# Patient Record
Sex: FEMALE | Race: WHITE | Employment: STUDENT | URBAN - METROPOLITAN AREA
[De-identification: names, ages, dates, MRNs, and addresses within clinical notes are randomized per-mention and may not be internally consistent; named-entity substitution may affect disease eponyms.]

---
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
## 2020-09-28 ENCOUNTER — TELEMEDICINE (OUTPATIENT)
Dept: FAMILY MEDICINE CLINIC | Facility: CLINIC | Age: 20
End: 2020-09-28

## 2020-09-28 ENCOUNTER — TELEPHONE (OUTPATIENT)
Dept: FAMILY MEDICINE CLINIC | Facility: CLINIC | Age: 20
End: 2020-09-28

## 2020-09-28 VITALS — TEMPERATURE: 100 F | BODY MASS INDEX: 20.46 KG/M2 | WEIGHT: 135 LBS | RESPIRATION RATE: 16 BRPM | HEIGHT: 68 IN

## 2020-09-28 DIAGNOSIS — Z20.822 SUSPECTED COVID-19 VIRUS INFECTION: ICD-10-CM

## 2020-09-28 DIAGNOSIS — J02.9 SORE THROAT: Primary | ICD-10-CM

## 2020-09-28 PROCEDURE — 3008F BODY MASS INDEX DOCD: CPT | Performed by: FAMILY MEDICINE

## 2020-09-28 PROCEDURE — 99213 OFFICE O/P EST LOW 20 MIN: CPT | Performed by: FAMILY MEDICINE

## 2020-09-28 NOTE — TELEPHONE ENCOUNTER
Pt called stating she had a video heather with .  She said  placed a Covid test for her. Pt stated THE [facility] called her to schedule. But Pt is now asking for a Rapid test because she needs to go back to work.

## 2020-09-28 NOTE — PROGRESS NOTES
Video visit  Please note that the following visit was completed using two-way, real-time interactive audio and video communication.   This has been done in good serenity to provide continuity of care in the best interest of the provider-patient relationship, d Other (Other) Mother         migraines   • High Cholesterol Maternal Grandmother    • High Blood Pressure Maternal Grandmother    • Other (Other) Maternal Grandmother         seizure disorder, migraines, osteoporosis   • High Cholesterol Maternal Grandfath

## 2020-09-29 NOTE — TELEPHONE ENCOUNTER
Spoke to patient and advised Rapid testing with same day results is not available through the doctor's offices. Dr. Elvia Walter has ordered the test with the most rapid results available to offices.  Explained with limited resources, the rapid tests are reserved

## 2020-10-22 ENCOUNTER — IMMUNIZATION (OUTPATIENT)
Dept: FAMILY MEDICINE CLINIC | Facility: CLINIC | Age: 20
End: 2020-10-22

## 2020-10-22 DIAGNOSIS — Z23 NEED FOR VACCINATION: ICD-10-CM

## 2020-10-22 PROCEDURE — 90686 IIV4 VACC NO PRSV 0.5 ML IM: CPT | Performed by: FAMILY MEDICINE

## 2020-10-22 PROCEDURE — 90471 IMMUNIZATION ADMIN: CPT | Performed by: FAMILY MEDICINE

## 2020-11-01 ENCOUNTER — APPOINTMENT (OUTPATIENT)
Dept: LAB | Age: 20
End: 2020-11-01
Attending: FAMILY MEDICINE
Payer: COMMERCIAL

## 2020-11-01 DIAGNOSIS — Z20.822 SUSPECTED COVID-19 VIRUS INFECTION: ICD-10-CM

## 2020-11-01 DIAGNOSIS — J02.9 SORE THROAT: ICD-10-CM

## 2020-11-13 ENCOUNTER — OFFICE VISIT (OUTPATIENT)
Dept: FAMILY MEDICINE CLINIC | Facility: CLINIC | Age: 20
End: 2020-11-13

## 2020-11-13 VITALS
WEIGHT: 127.38 LBS | TEMPERATURE: 97 F | RESPIRATION RATE: 16 BRPM | DIASTOLIC BLOOD PRESSURE: 62 MMHG | SYSTOLIC BLOOD PRESSURE: 104 MMHG | OXYGEN SATURATION: 99 % | HEART RATE: 94 BPM | HEIGHT: 68 IN | BODY MASS INDEX: 19.31 KG/M2

## 2020-11-13 DIAGNOSIS — K90.0 CELIAC DISEASE: ICD-10-CM

## 2020-11-13 DIAGNOSIS — E55.9 VITAMIN D DEFICIENCY: ICD-10-CM

## 2020-11-13 DIAGNOSIS — Z00.00 ROUTINE GENERAL MEDICAL EXAMINATION AT A HEALTH CARE FACILITY: Primary | ICD-10-CM

## 2020-11-13 DIAGNOSIS — Z30.011 ENCOUNTER FOR INITIAL PRESCRIPTION OF CONTRACEPTIVE PILLS: ICD-10-CM

## 2020-11-13 DIAGNOSIS — Z00.00 LABORATORY EXAM ORDERED AS PART OF ROUTINE GENERAL MEDICAL EXAMINATION: ICD-10-CM

## 2020-11-13 PROCEDURE — 3074F SYST BP LT 130 MM HG: CPT | Performed by: FAMILY MEDICINE

## 2020-11-13 PROCEDURE — 81025 URINE PREGNANCY TEST: CPT | Performed by: FAMILY MEDICINE

## 2020-11-13 PROCEDURE — 3078F DIAST BP <80 MM HG: CPT | Performed by: FAMILY MEDICINE

## 2020-11-13 PROCEDURE — 99395 PREV VISIT EST AGE 18-39: CPT | Performed by: FAMILY MEDICINE

## 2020-11-13 PROCEDURE — 3008F BODY MASS INDEX DOCD: CPT | Performed by: FAMILY MEDICINE

## 2020-11-13 RX ORDER — NORETHINDRONE ACETATE AND ETHINYL ESTRADIOL 1MG-20(21)
1 KIT ORAL DAILY
Qty: 3 PACKAGE | Refills: 0 | Status: SHIPPED | OUTPATIENT
Start: 2020-11-13

## 2020-11-13 NOTE — PROGRESS NOTES
Patient presents with:  Physical      HPI:  22yr old female with celiac disease presents for her annual physical and wanting to start OCPs. Menses regular, LMP 11/2. Lasting about 6d. Never sexually active but wanting to start OCPs.    Celiac disease, stab • High Blood Pressure Maternal Grandfather    • Cancer Paternal Grandmother         HX uterine CA, had hysterectomy and radiation, in remission   • Diabetes Sister         type 1     Social History    Tobacco Use      Smoking status: Never Smoker      Sm Date(s) Administered    >=3 YRS QUAD MULTIDOSE VIAL (20074) FLU CLINIC                          11/03/2016  10/26/2017      DTAP                  02/19/2001 04/17/2001 06/18/2001 03/11/2002 02/11/2005      FLU VAC QIV the age of 36.   If positive family hx (first degree relative) - Annual mammography starting ten years prior to the age of the youngest family member with breast cancer (but not earlier than age 22 and not later than age 36)    Colon Cancer Screening: Start

## 2020-11-13 NOTE — PATIENT INSTRUCTIONS
Prevention Guidelines, Women Ages 25 to 44  Screening tests and vaccines are an important part of managing your health. A screening test is done to find possible disorders or diseases in people who don't have any symptoms.  The goal is to find a disease e Hepatitis C Anyone at increased risk  At routine exams   HIV All women At routine exams3     Obesity All women in this age group  At routine exams   Syphilis Women at increased risk for infection should talk with their healthcare provider  At routine exams Pneumococcal conjugate vaccine (PCV13) and pneumococcal polysaccharide vaccine (PPSV23)  Women at increased risk for infection should talk with their healthcare provider  PCV13: 1 dose ages 23 to 72 (protects against 13 types of pneumococcal bacteria)   PP 3 The USPSTF recommends that all people ages 13 to 72 years be screened for HIV and those younger or older people at increased risk.  The CDC recommends that everyone between the ages of 15 and 59 get tested for HIV at least once as part of routine health c · May cause side effects such as nausea, irregular bleeding, headaches, breast tenderness, fatigue, or mood changes (these often go away within 3 months)  · May increase the risk of blood clots, heart attack, and stroke    The pill may not be for you  The

## 2021-01-15 ENCOUNTER — IMMUNIZATION (OUTPATIENT)
Dept: LAB | Facility: HOSPITAL | Age: 21
End: 2021-01-15
Attending: EMERGENCY MEDICINE
Payer: COMMERCIAL

## 2021-01-15 DIAGNOSIS — Z23 NEED FOR VACCINATION: Primary | ICD-10-CM

## 2021-01-15 PROCEDURE — 0011A SARSCOV2 VAC 100MCG/0.5ML IM: CPT

## 2021-02-12 ENCOUNTER — IMMUNIZATION (OUTPATIENT)
Dept: LAB | Facility: HOSPITAL | Age: 21
End: 2021-02-12
Attending: PREVENTIVE MEDICINE
Payer: COMMERCIAL

## 2021-02-12 DIAGNOSIS — Z23 NEED FOR VACCINATION: Primary | ICD-10-CM

## 2021-02-12 PROCEDURE — 0012A SARSCOV2 VAC 100MCG/0.5ML IM: CPT

## 2021-05-03 ENCOUNTER — TELEPHONE (OUTPATIENT)
Dept: FAMILY MEDICINE CLINIC | Facility: CLINIC | Age: 21
End: 2021-05-03

## 2021-05-03 NOTE — TELEPHONE ENCOUNTER
Called patient who states for the past couple of days she has been experiencing sinus pressure and ear pain. Denies fever/chills, sore throat or cough. Denies any seasonal allergies. Has been taking Dayquil.  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

## 2021-05-07 ENCOUNTER — LAB ENCOUNTER (OUTPATIENT)
Dept: LAB | Age: 21
End: 2021-05-07
Attending: FAMILY MEDICINE
Payer: COMMERCIAL

## 2021-05-07 DIAGNOSIS — Z00.00 LABORATORY EXAM ORDERED AS PART OF ROUTINE GENERAL MEDICAL EXAMINATION: ICD-10-CM

## 2021-05-07 DIAGNOSIS — E55.9 VITAMIN D DEFICIENCY: ICD-10-CM

## 2021-05-07 PROCEDURE — 85025 COMPLETE CBC W/AUTO DIFF WBC: CPT

## 2021-05-07 PROCEDURE — 84443 ASSAY THYROID STIM HORMONE: CPT

## 2021-05-07 PROCEDURE — 80061 LIPID PANEL: CPT

## 2021-05-07 PROCEDURE — 80053 COMPREHEN METABOLIC PANEL: CPT

## 2021-05-07 PROCEDURE — 36415 COLL VENOUS BLD VENIPUNCTURE: CPT

## 2021-05-07 PROCEDURE — 82306 VITAMIN D 25 HYDROXY: CPT

## 2021-08-17 ENCOUNTER — TELEPHONE (OUTPATIENT)
Dept: FAMILY MEDICINE CLINIC | Facility: CLINIC | Age: 21
End: 2021-08-17

## 2021-08-17 NOTE — TELEPHONE ENCOUNTER
Patient's mom stated pt was at the dentist where they did xrays. Dentist told pt that, according to the xray, pt could have a sinus infection. Mom stated pt has no symptoms and that pt feels fine.   Dentist also told pt that she has a tooth, in front, a

## 2021-08-17 NOTE — TELEPHONE ENCOUNTER
No call back# confirmed. [phone]  Called and spoke with pt's mother, Rodrigo Sorto (Rosy Fajardo per HIPAA), to confirm pt is asymptomatic. Pt is leaving for school this Saturday. Advised we do not prescribe antibiotic without an OV.  Advised if pt develops, we recom

## 2021-12-14 ENCOUNTER — IMMUNIZATION (OUTPATIENT)
Dept: LAB | Facility: HOSPITAL | Age: 21
End: 2021-12-14
Attending: EMERGENCY MEDICINE
Payer: COMMERCIAL

## 2021-12-14 DIAGNOSIS — Z23 NEED FOR VACCINATION: Primary | ICD-10-CM

## 2021-12-14 PROCEDURE — 0064A SARSCOV2 VAC 50MCG/0.25ML IM: CPT

## 2022-01-05 ENCOUNTER — TELEPHONE (OUTPATIENT)
Dept: FAMILY MEDICINE CLINIC | Facility: CLINIC | Age: 22
End: 2022-01-05

## 2022-01-05 NOTE — TELEPHONE ENCOUNTER
[phone] pt stating had experienced 5 episodes of vomiting and 6 watery BM yesterday (1/4/22). No vomiting today (1/5). BM better, semi-formed. No abd pain. No swelling. No fever/chills. No radiating pain. No recent travels. No dizziness.  No

## 2022-04-11 ENCOUNTER — PROCEDURE VISIT (OUTPATIENT)
Dept: SPORTS MEDICINE | Age: 22
End: 2022-04-11

## 2022-04-11 DIAGNOSIS — S63.639A SPRAIN OF PROXIMAL INTERPHALANGEAL (PIP) JOINT OF FINGER: Primary | ICD-10-CM

## 2022-04-11 NOTE — PROGRESS NOTES
Athletic Training  Date of Report: 2022  Name: Angela Milleroked: [facility]  Sport: [employer] [place]  : 2000  Age: 24 y.o. MRN: <[id number]>  Encounter:  [x] New AT Eval     [] Follow-Up Visit    [] Other:   SUBJECTIVE:  Reason for Visit:    Chief Complaint   Patient presents with   Fam Douglas is a 24y.o. year old, female who presents today for evaluation of athletic injury involving left wrist/hand. Onset of the injury began a few days ago and injury occurred during competition. Current pain and symptoms include: sharp and tight. Current level of pain is a 6. Symptoms have been acute since that time. Symptoms improve with rest, ice and medication: ibuprofen. Symptoms worsen with straightening and bending finger. The patient can not extend and flex the hand and all fingers. The hand has not felt numb and/or lost sensation. The hand/wrist complaint has limited the athlete from participating. Associated sounds or feelings at time of injury included: none. Treatment to date has included: ice, medication: ibuprofen, rest and taping. Treatment has been somewhat helpful. Previous history includes: Jammed Fingers. 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 She was blocking a shot with her left hand when she felt her L ring finger 'jam.' She was unable to shake the injury off and was not able to play in the rest of the games. Denies hearing or feeling a crack/pop in her finger. She has kept her finger dallin taped since it occurred on Saturday. She has also been icing occasionally and taking ibuprofen. Initially, when the injury occurred she was not able to flex or extend her 4th finger. Since Saturday her finger flexion has improved but finger extension is still extremely painful. Bruising and swelling present around L ring finger.    OBJECTIVE:   Physical Exam  Vital Signs:   [x] There were no vitals taken for this visit  Date/Time Taken         Blood Pressure Pulse          Constitution:   Appearance: Maisha Edmond is [x] alert, [x] appears stated age, and [x] in no distress. Maisha Edmond general body habitus is:    [] Cachectic [] Thin [x] Normal [] Obese [] Morbidly Obese  Pulmonary: Rate   [] Fast [x] Normal [] Slow    Rhythm  [x] Regular [] Irregular   Volume [x] Adequate  [] Shallow [] Deep  Effort  [] Labored [x] Unlabored  Skin:  Color  [x] Normal [] Pale [] Cyanotic    Temperature [] Hot   [x] Warm [] Cool  [] Cold     Moisture [] Dry  [x] Moist [] Warm    Psychiatric:   [x] Good judgement and insight. [x] Oriented to [x] person, [x] place, and [x] time. [x] Mood appropriate for circumstances.   Elbow Positioning / Carry Position:    Elbow Position: [x] Normal  [] Guarding   [] Hanging Limp  Assistive Device: [x] None  [] Brace  [] Sling  [] Other:   Inspection:   Skin:   [x] Intact [] Abrasion  [] Laceration  Notes:   Ecchymosis:  [] None [x] Mild  [] Moderate  [] Severe  Notes:   Atrophy:  [x] None [] Mild  [] Moderate  [] Severe  Notes:   Effusion:  [] None [x] Mild  [] Moderate  [] Severe  Notes:   Deformity:  [x] None [] Mild  [] Moderate  [] Severe  Notes:   Scar / Surgical incision(s): [] A-Scope Portals  [] Open Surgical Incision(s)  Notes:   Joint Hypertrophy:  Notes:   Alignment:   [x] Alignment was not assessed   Normal Measured Findings/Notes Passively Correctable to Normal   Ape Hand [x]  []   Fuller's Deformity [x]  []   Claw Hand [x]  []   Dupuytren's Contracture [x]  []   [place] -Neck Deformity [x]  []   Volkmann's Contracture [x]  []   Fingernail Alignment []  []   Orthopaedic Exam: Left Hand/Wrist  Palpation:   Tenderness: [] None  [] Mild [x] Moderate [] Severe   at: Proximal Phalanges 4 and PIP Joint 4  Crepitation: [x] None  [] Mild [] Moderate [] Severe   at: Metacarpals 4, MCP Joint 4, Proximal Phalanges 4, PIP Joint 4, Middle Phalanges 4, DIP Joint 4 and Distal Phalanges 4  Effusion: [] None  [x] Mild [] Moderate [] Severe   at: Proximal Phalanges 4 and PIP Joint 4  Brachial Pulse:  [x] Not assessed [] Not Detected [] Detected  Radial Pulse:  [x] Not assessed [] Not Detected [] Detected  Deformity:  None  Range of Motion: (Not assessed if not marked)  [] Normal Flexibility / Mobility   ROM WNL PROM AROM OP Comments     L R L R L R    Wrist           Flexion  [x]          Extension [x]          Supination [x]          Pronation [x]          Ulnar Deviation [x]          Radial Deviation [x]          Fingers           MCP Flexion  [x]          MCP Extension [x]          MCP Abduction [x]          MCP Adduction [x]          PIP Flexion  [] mild p!  mild p! Almost WNL   PIP Extension [] p! P!     Very painful   DIP Flexion  [x]          DIP Extension [x]          Thumb-CMC           Flexion  []          Extension []          Abduction []          Adduction []          Manual Muscle Test: (Not assessed if not marked)  [] Normal Strength  MMT Left Right Comment   Wrist      Flexion       Extension      Supination      Pronation      Ulnar Deviation      Radial Deviation      Fingers      MCP Flexion       MCP Extension      MCP Abduction      MCP Adduction      PIP Flexion       PIP Extension      DIP Flexion       DIP Extension       Dynamometry      Thumb-CMC      Flexion       Extension      Abduction      Adduction      Provocative Tests: (Not tested if not marked)   Negative Positive Positive Findings   Fracture / Dislocation      Tap [x] []    Compression [x] []    Lozada's Sign [] []    R/C Stability      Varus Stress Test [] []    Valgus [] []    Glide [] []    Neurovascular      Tinel Sign [] []    Wrinkle Test [] []    Digital Wan Test [] []    Elbow Flex [] []    Wrist Pathology       Phalen Test [] []    Reverse Phalen Test [] []    Hand Pathology       Long Finger Flexion Test [] []    Bunnel Littler Test [] []    Pinch  [] []    Finger Pathology      MCP [place] [] []    PIP Varus Stress Test [x] []    PIP Valgus Stress Test [x] []    DIP Varus Stress Test [x] []    DIP Valgus Stress Test [x] []    Thumb Pathology      Marti Dallin [] []    deQuervain's Sign [] []    Froment's Sign [] []    Hubbard Test [] []    Miscellaneous       [] []     [] []    Reflex / Motor Function:    Gross motor weakness of shoulder:  [x] None [] Mild  [] Moderate [] Severe  Notes:   Gross motor weakness of elbow:  [x] None [] Mild  [] Moderate [] Severe  Notes:   Gross motor weakness of wrist:  [x] None [] Mild  [] Moderate [] Severe  Notes:   Gross motor weakness of hand:  [x] None [] Mild  [] Moderate [] Severe  Notes:    Sensory / Neurologic Function:  [x] Sensation to light touch intact    [] Impaired:   [x] Deep tendon reflexes intact    [] Impaired:   [x] Coordination / proprioception intact  [] Impaired:   Contralateral Hand / Wrist:  [x] Normal ROM and function with no pain. ASSESSMENT:   Diagnosis Orders   1. Sprain of proximal interphalangeal (PIP) joint of finger       Clinical Impression: Phalange: Ring Proximal Interphalangeal Sprain  Status: No Participation   Est. Time Missed: 3-7 Days  PLAN:  Treatment:  [x] Rest  [x] Ice   [] Wrap  [] Elevate  [x] Tape  [] First Aid/Wound [] Moist Heat  [] Crutches  [] Brace  [] Splint  [] Sling  [] Immobilizer   [] Whirlpool  [] Massage  [] Pneumatic  [x] Rehab/Exercise  [x] Other: NSAIDs   Guardian Contacted: No  Comments / Instructions: Eleni's left 4th PIP and proximal phalange are currently swollen. Very ttp 4th PIP. No concern for fracture but there is a possibility of a tendon injury. Instructed her on HEP and she will follow-up at the end of the week if there is no improvement in her finger extension. No participation at this time. Follow-Up Care / Instructions:   HEP Information: Continue to wear dallin tape throughout the day. Instructed her to ice multiple times a day and continue to take ibuprofen regularly.  Continue to work on flexion and extension as tolerated throughout the day.    Discharged: Yes  Electronically Signed By: Selina Guillermo ATC, LAT

## 2022-04-21 ENCOUNTER — PROCEDURE VISIT (OUTPATIENT)
Dept: SPORTS MEDICINE | Age: 22
End: 2022-04-21

## 2022-04-21 DIAGNOSIS — S63.639A SPRAIN OF PROXIMAL INTERPHALANGEAL (PIP) JOINT OF FINGER: Primary | ICD-10-CM

## 2022-04-21 NOTE — PROGRESS NOTES
Subjective:      Patient ID: Dean Raymond is a 24 y.o. female. Tal Block comes in today to have her L 4th phalange re-evaluated. She reports her finger feels significantly improved but is still not at 100%. Notes that her ROM has improved but is not full yet. 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 She has practiced but has not had to block any shots with her L hand. She continues to dallin tape her finger for practice and it feels better when it is taped. Objective:   Physical Exam     No bruising or swelling present on L 4th PIP/Phalange. Still mild p! With P/AROM full finger flexion and extension. Able to achieve full finger flexion and extension with PROM. Assessment:       Diagnosis Orders   1. Sprain of proximal interphalangeal (PIP) joint of finger           Plan:      Tal Block is going to try to play a few quarters in the games this weekend. She is going to keep her finger dallin taped for the games as there is a good chance her finger will be in increased pain if the ball hits her L hand wrong. Advised her to continue to work on both her finger flexion and extension as tolerated. She will follow-up with us after the weekend.          Yoel Babcock, ATC

## 2022-05-12 ENCOUNTER — HOSPITAL ENCOUNTER (OUTPATIENT)
Dept: GENERAL RADIOLOGY | Age: 22
Discharge: HOME | End: 2022-05-12
Attending: FAMILY MEDICINE
Payer: COMMERCIAL

## 2022-05-12 ENCOUNTER — OFFICE VISIT (OUTPATIENT)
Dept: FAMILY MEDICINE CLINIC | Facility: CLINIC | Age: 22
End: 2022-05-12
Payer: COMMERCIAL

## 2022-05-12 VITALS
HEIGHT: 68 IN | DIASTOLIC BLOOD PRESSURE: 66 MMHG | RESPIRATION RATE: 16 BRPM | TEMPERATURE: 99 F | WEIGHT: 123.13 LBS | SYSTOLIC BLOOD PRESSURE: 110 MMHG | OXYGEN SATURATION: 99 % | BODY MASS INDEX: 18.66 KG/M2 | HEART RATE: 78 BPM

## 2022-05-12 DIAGNOSIS — S69.92XA INJURY OF FINGER OF LEFT HAND, INITIAL ENCOUNTER: ICD-10-CM

## 2022-05-12 DIAGNOSIS — S69.92XA INJURY OF FINGER OF LEFT HAND, INITIAL ENCOUNTER: Primary | ICD-10-CM

## 2022-05-12 PROCEDURE — 99213 OFFICE O/P EST LOW 20 MIN: CPT | Performed by: FAMILY MEDICINE

## 2022-05-12 PROCEDURE — 3008F BODY MASS INDEX DOCD: CPT | Performed by: FAMILY MEDICINE

## 2022-05-12 PROCEDURE — 73140 X-RAY EXAM OF FINGER(S): CPT | Performed by: FAMILY MEDICINE

## 2022-05-12 PROCEDURE — 3078F DIAST BP <80 MM HG: CPT | Performed by: FAMILY MEDICINE

## 2022-05-12 PROCEDURE — 3074F SYST BP LT 130 MM HG: CPT | Performed by: FAMILY MEDICINE

## 2022-06-02 ENCOUNTER — OFFICE VISIT (OUTPATIENT)
Dept: ORTHOPEDICS CLINIC | Facility: CLINIC | Age: 22
End: 2022-06-02
Payer: COMMERCIAL

## 2022-06-02 VITALS — OXYGEN SATURATION: 100 % | HEART RATE: 61 BPM | BODY MASS INDEX: 18.94 KG/M2 | WEIGHT: 125 LBS | HEIGHT: 68 IN

## 2022-06-02 DIAGNOSIS — M24.542 FLEXION CONTRACTURE OF JOINT OF HAND, LEFT: Primary | ICD-10-CM

## 2022-06-02 PROCEDURE — 99204 OFFICE O/P NEW MOD 45 MIN: CPT | Performed by: PHYSICIAN ASSISTANT

## 2022-06-02 PROCEDURE — 3008F BODY MASS INDEX DOCD: CPT | Performed by: PHYSICIAN ASSISTANT

## 2022-06-06 ENCOUNTER — TELEPHONE (OUTPATIENT)
Dept: PHYSICAL THERAPY | Facility: HOSPITAL | Age: 22
End: 2022-06-06

## 2022-06-08 ENCOUNTER — OFFICE VISIT (OUTPATIENT)
Dept: OCCUPATIONAL MEDICINE | Age: 22
End: 2022-06-08
Attending: PHYSICIAN ASSISTANT
Payer: COMMERCIAL

## 2022-06-08 DIAGNOSIS — M24.542 FLEXION CONTRACTURE OF JOINT OF HAND, LEFT: ICD-10-CM

## 2022-06-08 PROCEDURE — 97760 ORTHOTIC MGMT&TRAING 1ST ENC: CPT

## 2022-06-08 PROCEDURE — 97035 APP MDLTY 1+ULTRASOUND EA 15: CPT

## 2022-06-08 PROCEDURE — 97165 OT EVAL LOW COMPLEX 30 MIN: CPT

## 2022-06-13 ENCOUNTER — OFFICE VISIT (OUTPATIENT)
Dept: OCCUPATIONAL MEDICINE | Age: 22
End: 2022-06-13
Attending: PHYSICIAN ASSISTANT
Payer: COMMERCIAL

## 2022-06-13 DIAGNOSIS — M24.542 FLEXION CONTRACTURE OF JOINT OF HAND, LEFT: ICD-10-CM

## 2022-06-13 PROCEDURE — 97530 THERAPEUTIC ACTIVITIES: CPT

## 2022-06-13 PROCEDURE — 97110 THERAPEUTIC EXERCISES: CPT

## 2022-06-13 PROCEDURE — 97760 ORTHOTIC MGMT&TRAING 1ST ENC: CPT

## 2022-06-15 ENCOUNTER — OFFICE VISIT (OUTPATIENT)
Dept: OCCUPATIONAL MEDICINE | Age: 22
End: 2022-06-15
Attending: PHYSICIAN ASSISTANT
Payer: COMMERCIAL

## 2022-06-15 DIAGNOSIS — M24.542 FLEXION CONTRACTURE OF JOINT OF HAND, LEFT: ICD-10-CM

## 2022-06-15 PROCEDURE — 97760 ORTHOTIC MGMT&TRAING 1ST ENC: CPT

## 2022-06-15 PROCEDURE — 97530 THERAPEUTIC ACTIVITIES: CPT

## 2022-06-15 PROCEDURE — 97035 APP MDLTY 1+ULTRASOUND EA 15: CPT

## 2022-06-15 PROCEDURE — 97140 MANUAL THERAPY 1/> REGIONS: CPT

## 2022-06-20 ENCOUNTER — OFFICE VISIT (OUTPATIENT)
Dept: OCCUPATIONAL MEDICINE | Age: 22
End: 2022-06-20
Attending: PHYSICIAN ASSISTANT
Payer: COMMERCIAL

## 2022-06-20 DIAGNOSIS — M24.542 FLEXION CONTRACTURE OF JOINT OF HAND, LEFT: ICD-10-CM

## 2022-06-20 PROCEDURE — 97140 MANUAL THERAPY 1/> REGIONS: CPT

## 2022-06-20 PROCEDURE — 97760 ORTHOTIC MGMT&TRAING 1ST ENC: CPT

## 2022-06-20 PROCEDURE — 97110 THERAPEUTIC EXERCISES: CPT

## 2022-06-20 PROCEDURE — 97530 THERAPEUTIC ACTIVITIES: CPT

## 2022-06-22 ENCOUNTER — OFFICE VISIT (OUTPATIENT)
Dept: OCCUPATIONAL MEDICINE | Age: 22
End: 2022-06-22
Attending: PHYSICIAN ASSISTANT
Payer: COMMERCIAL

## 2022-06-22 DIAGNOSIS — M24.542 FLEXION CONTRACTURE OF JOINT OF HAND, LEFT: ICD-10-CM

## 2022-06-22 PROCEDURE — 97760 ORTHOTIC MGMT&TRAING 1ST ENC: CPT

## 2022-06-22 PROCEDURE — 97140 MANUAL THERAPY 1/> REGIONS: CPT

## 2022-06-22 PROCEDURE — 97035 APP MDLTY 1+ULTRASOUND EA 15: CPT

## 2022-06-27 ENCOUNTER — OFFICE VISIT (OUTPATIENT)
Dept: OCCUPATIONAL MEDICINE | Age: 22
End: 2022-06-27
Attending: PHYSICIAN ASSISTANT
Payer: COMMERCIAL

## 2022-06-27 DIAGNOSIS — M24.542 FLEXION CONTRACTURE OF JOINT OF HAND, LEFT: ICD-10-CM

## 2022-06-27 PROCEDURE — 97110 THERAPEUTIC EXERCISES: CPT

## 2022-06-27 PROCEDURE — 97140 MANUAL THERAPY 1/> REGIONS: CPT

## 2022-06-27 PROCEDURE — 97530 THERAPEUTIC ACTIVITIES: CPT

## 2022-06-27 PROCEDURE — 97035 APP MDLTY 1+ULTRASOUND EA 15: CPT

## 2022-06-29 ENCOUNTER — OFFICE VISIT (OUTPATIENT)
Dept: OCCUPATIONAL MEDICINE | Age: 22
End: 2022-06-29
Attending: PHYSICIAN ASSISTANT
Payer: COMMERCIAL

## 2022-06-29 DIAGNOSIS — M24.542 FLEXION CONTRACTURE OF JOINT OF HAND, LEFT: ICD-10-CM

## 2022-06-29 PROCEDURE — 97035 APP MDLTY 1+ULTRASOUND EA 15: CPT

## 2022-06-29 PROCEDURE — 97110 THERAPEUTIC EXERCISES: CPT

## 2022-06-29 PROCEDURE — 97530 THERAPEUTIC ACTIVITIES: CPT

## 2022-06-29 PROCEDURE — 97140 MANUAL THERAPY 1/> REGIONS: CPT

## 2022-06-30 ENCOUNTER — OFFICE VISIT (OUTPATIENT)
Dept: ORTHOPEDICS CLINIC | Facility: CLINIC | Age: 22
End: 2022-06-30
Payer: COMMERCIAL

## 2022-06-30 VITALS — WEIGHT: 125 LBS | BODY MASS INDEX: 18.94 KG/M2 | HEIGHT: 68 IN

## 2022-06-30 DIAGNOSIS — M24.542 FLEXION CONTRACTURE OF JOINT OF HAND, LEFT: Primary | ICD-10-CM

## 2022-06-30 PROCEDURE — 99212 OFFICE O/P EST SF 10 MIN: CPT | Performed by: PHYSICIAN ASSISTANT

## 2022-06-30 PROCEDURE — 3008F BODY MASS INDEX DOCD: CPT | Performed by: PHYSICIAN ASSISTANT

## 2022-07-06 ENCOUNTER — OFFICE VISIT (OUTPATIENT)
Dept: OCCUPATIONAL MEDICINE | Age: 22
End: 2022-07-06
Attending: PHYSICIAN ASSISTANT
Payer: COMMERCIAL

## 2022-07-06 DIAGNOSIS — M24.542 FLEXION CONTRACTURE OF JOINT OF HAND, LEFT: ICD-10-CM

## 2022-07-06 PROCEDURE — 97530 THERAPEUTIC ACTIVITIES: CPT

## 2022-07-06 PROCEDURE — 97140 MANUAL THERAPY 1/> REGIONS: CPT

## 2022-07-06 PROCEDURE — 97035 APP MDLTY 1+ULTRASOUND EA 15: CPT

## 2022-07-06 PROCEDURE — 97110 THERAPEUTIC EXERCISES: CPT

## 2022-07-08 ENCOUNTER — OFFICE VISIT (OUTPATIENT)
Dept: OCCUPATIONAL MEDICINE | Age: 22
End: 2022-07-08
Attending: PHYSICIAN ASSISTANT
Payer: COMMERCIAL

## 2022-07-08 PROCEDURE — 97035 APP MDLTY 1+ULTRASOUND EA 15: CPT

## 2022-07-08 PROCEDURE — 97110 THERAPEUTIC EXERCISES: CPT

## 2022-07-08 PROCEDURE — 97530 THERAPEUTIC ACTIVITIES: CPT

## 2022-07-08 PROCEDURE — 97140 MANUAL THERAPY 1/> REGIONS: CPT

## 2022-07-18 ENCOUNTER — OFFICE VISIT (OUTPATIENT)
Dept: OCCUPATIONAL MEDICINE | Age: 22
End: 2022-07-18
Attending: PHYSICIAN ASSISTANT
Payer: COMMERCIAL

## 2022-07-18 PROCEDURE — 97035 APP MDLTY 1+ULTRASOUND EA 15: CPT

## 2022-07-18 PROCEDURE — 97018 PARAFFIN BATH THERAPY: CPT

## 2022-07-18 PROCEDURE — 97110 THERAPEUTIC EXERCISES: CPT

## 2022-07-22 ENCOUNTER — OFFICE VISIT (OUTPATIENT)
Dept: OCCUPATIONAL MEDICINE | Age: 22
End: 2022-07-22
Attending: PHYSICIAN ASSISTANT
Payer: COMMERCIAL

## 2022-07-22 PROCEDURE — 97140 MANUAL THERAPY 1/> REGIONS: CPT

## 2022-07-22 PROCEDURE — 97018 PARAFFIN BATH THERAPY: CPT

## 2022-07-22 PROCEDURE — 97035 APP MDLTY 1+ULTRASOUND EA 15: CPT

## 2022-07-22 PROCEDURE — 97110 THERAPEUTIC EXERCISES: CPT

## 2022-07-27 ENCOUNTER — OFFICE VISIT (OUTPATIENT)
Dept: OCCUPATIONAL MEDICINE | Age: 22
End: 2022-07-27
Attending: PHYSICIAN ASSISTANT
Payer: COMMERCIAL

## 2022-07-27 PROCEDURE — 97035 APP MDLTY 1+ULTRASOUND EA 15: CPT

## 2022-07-27 PROCEDURE — 97110 THERAPEUTIC EXERCISES: CPT

## 2022-07-27 PROCEDURE — 97140 MANUAL THERAPY 1/> REGIONS: CPT

## 2022-08-15 ENCOUNTER — OFFICE VISIT (OUTPATIENT)
Dept: FAMILY MEDICINE CLINIC | Facility: CLINIC | Age: 22
End: 2022-08-15
Payer: COMMERCIAL

## 2022-08-15 VITALS
RESPIRATION RATE: 16 BRPM | HEIGHT: 68 IN | OXYGEN SATURATION: 98 % | WEIGHT: 123.5 LBS | SYSTOLIC BLOOD PRESSURE: 102 MMHG | BODY MASS INDEX: 18.72 KG/M2 | DIASTOLIC BLOOD PRESSURE: 62 MMHG | HEART RATE: 72 BPM | TEMPERATURE: 98 F

## 2022-08-15 DIAGNOSIS — E55.9 VITAMIN D DEFICIENCY: ICD-10-CM

## 2022-08-15 DIAGNOSIS — Z23 NEED FOR VACCINATION: ICD-10-CM

## 2022-08-15 DIAGNOSIS — N94.6 DYSMENORRHEA: ICD-10-CM

## 2022-08-15 DIAGNOSIS — Z00.00 ROUTINE GENERAL MEDICAL EXAMINATION AT A HEALTH CARE FACILITY: Primary | ICD-10-CM

## 2022-08-15 DIAGNOSIS — Z00.00 LABORATORY EXAM ORDERED AS PART OF ROUTINE GENERAL MEDICAL EXAMINATION: ICD-10-CM

## 2022-08-15 PROCEDURE — 99395 PREV VISIT EST AGE 18-39: CPT | Performed by: FAMILY MEDICINE

## 2022-08-15 PROCEDURE — 3074F SYST BP LT 130 MM HG: CPT | Performed by: FAMILY MEDICINE

## 2022-08-15 PROCEDURE — 90471 IMMUNIZATION ADMIN: CPT | Performed by: FAMILY MEDICINE

## 2022-08-15 PROCEDURE — 3008F BODY MASS INDEX DOCD: CPT | Performed by: FAMILY MEDICINE

## 2022-08-15 PROCEDURE — 3078F DIAST BP <80 MM HG: CPT | Performed by: FAMILY MEDICINE

## 2022-08-15 PROCEDURE — 90715 TDAP VACCINE 7 YRS/> IM: CPT | Performed by: FAMILY MEDICINE

## 2022-08-15 PROCEDURE — 99213 OFFICE O/P EST LOW 20 MIN: CPT | Performed by: FAMILY MEDICINE

## 2022-08-15 RX ORDER — NAPROXEN 500 MG/1
500 TABLET ORAL 2 TIMES DAILY WITH MEALS
Qty: 30 TABLET | Refills: 0 | Status: SHIPPED | OUTPATIENT
Start: 2022-08-15

## 2022-08-15 RX ORDER — ONDANSETRON 4 MG/1
4 TABLET, FILM COATED ORAL EVERY 8 HOURS PRN
Qty: 20 TABLET | Refills: 0 | Status: SHIPPED | OUTPATIENT
Start: 2022-08-15

## 2022-08-16 ENCOUNTER — LAB ENCOUNTER (OUTPATIENT)
Dept: LAB | Age: 22
End: 2022-08-16
Attending: FAMILY MEDICINE
Payer: COMMERCIAL

## 2022-08-16 DIAGNOSIS — Z00.00 LABORATORY EXAM ORDERED AS PART OF ROUTINE GENERAL MEDICAL EXAMINATION: ICD-10-CM

## 2022-08-16 DIAGNOSIS — E55.9 VITAMIN D DEFICIENCY: ICD-10-CM

## 2022-08-16 LAB
ALBUMIN SERPL-MCNC: 4.1 G/DL (ref 3.4–5)
ALBUMIN/GLOB SERPL: 1.5 {RATIO} (ref 1–2)
ALP LIVER SERPL-CCNC: 54 U/L
ALT SERPL-CCNC: 27 U/L
ANION GAP SERPL CALC-SCNC: 8 MMOL/L (ref 0–18)
AST SERPL-CCNC: 12 U/L (ref 15–37)
BASOPHILS # BLD AUTO: 0.02 X10(3) UL (ref 0–0.2)
BASOPHILS NFR BLD AUTO: 0.4 %
BILIRUB SERPL-MCNC: 0.7 MG/DL (ref 0.1–2)
BUN BLD-MCNC: 9 MG/DL (ref 7–18)
BUN/CREAT SERPL: 12.7 (ref 10–20)
CALCIUM BLD-MCNC: 9.1 MG/DL (ref 8.5–10.1)
CHLORIDE SERPL-SCNC: 108 MMOL/L (ref 98–112)
CHOLEST SERPL-MCNC: 174 MG/DL (ref ?–200)
CO2 SERPL-SCNC: 25 MMOL/L (ref 21–32)
CREAT BLD-MCNC: 0.71 MG/DL
DEPRECATED RDW RBC AUTO: 44.9 FL (ref 35.1–46.3)
EOSINOPHIL # BLD AUTO: 0.09 X10(3) UL (ref 0–0.7)
EOSINOPHIL NFR BLD AUTO: 1.8 %
ERYTHROCYTE [DISTWIDTH] IN BLOOD BY AUTOMATED COUNT: 12.8 % (ref 11–15)
FASTING PATIENT LIPID ANSWER: YES
FASTING STATUS PATIENT QL REPORTED: YES
GFR SERPLBLD BASED ON 1.73 SQ M-ARVRAT: 124 ML/MIN/1.73M2 (ref 60–?)
GLOBULIN PLAS-MCNC: 2.7 G/DL (ref 2.8–4.4)
GLUCOSE BLD-MCNC: 75 MG/DL (ref 70–99)
HCT VFR BLD AUTO: 40.9 %
HDLC SERPL-MCNC: 74 MG/DL (ref 40–59)
HGB BLD-MCNC: 12.9 G/DL
IMM GRANULOCYTES # BLD AUTO: 0.01 X10(3) UL (ref 0–1)
IMM GRANULOCYTES NFR BLD: 0.2 %
LDLC SERPL CALC-MCNC: 90 MG/DL (ref ?–100)
LYMPHOCYTES # BLD AUTO: 1.42 X10(3) UL (ref 1–4)
LYMPHOCYTES NFR BLD AUTO: 28.3 %
MCH RBC QN AUTO: 29.9 PG (ref 26–34)
MCHC RBC AUTO-ENTMCNC: 31.5 G/DL (ref 31–37)
MCV RBC AUTO: 94.7 FL
MONOCYTES # BLD AUTO: 0.48 X10(3) UL (ref 0.1–1)
MONOCYTES NFR BLD AUTO: 9.6 %
NEUTROPHILS # BLD AUTO: 3 X10 (3) UL (ref 1.5–7.7)
NEUTROPHILS # BLD AUTO: 3 X10(3) UL (ref 1.5–7.7)
NEUTROPHILS NFR BLD AUTO: 59.7 %
NONHDLC SERPL-MCNC: 100 MG/DL (ref ?–130)
OSMOLALITY SERPL CALC.SUM OF ELEC: 289 MOSM/KG (ref 275–295)
PLATELET # BLD AUTO: 244 10(3)UL (ref 150–450)
POTASSIUM SERPL-SCNC: 4.2 MMOL/L (ref 3.5–5.1)
PROT SERPL-MCNC: 6.8 G/DL (ref 6.4–8.2)
RBC # BLD AUTO: 4.32 X10(6)UL
SODIUM SERPL-SCNC: 141 MMOL/L (ref 136–145)
TRIGL SERPL-MCNC: 48 MG/DL (ref 30–149)
TSI SER-ACNC: 1.33 MIU/ML (ref 0.36–3.74)
VIT D+METAB SERPL-MCNC: 20.8 NG/ML (ref 30–100)
VLDLC SERPL CALC-MCNC: 8 MG/DL (ref 0–30)
WBC # BLD AUTO: 5 X10(3) UL (ref 4–11)

## 2022-08-16 PROCEDURE — 80061 LIPID PANEL: CPT

## 2022-08-16 PROCEDURE — 85025 COMPLETE CBC W/AUTO DIFF WBC: CPT

## 2022-08-16 PROCEDURE — 84443 ASSAY THYROID STIM HORMONE: CPT

## 2022-08-16 PROCEDURE — 80053 COMPREHEN METABOLIC PANEL: CPT

## 2022-08-16 PROCEDURE — 36415 COLL VENOUS BLD VENIPUNCTURE: CPT

## 2022-08-16 PROCEDURE — 82306 VITAMIN D 25 HYDROXY: CPT

## 2023-06-06 RX ORDER — NAPROXEN 500 MG/1
500 TABLET ORAL 2 TIMES DAILY WITH MEALS
Qty: 30 TABLET | Refills: 0 | Status: SHIPPED | OUTPATIENT
Start: 2023-06-06

## 2023-07-27 ENCOUNTER — PATIENT MESSAGE (OUTPATIENT)
Dept: FAMILY MEDICINE CLINIC | Facility: CLINIC | Age: 23
End: 2023-07-27

## 2023-07-28 NOTE — TELEPHONE ENCOUNTER
From: Stephanie Records  To: Marsha Schwartz DO  Sent: 7/27/2023 9:14 PM CDT  Subject: Dermatologist Referral Request    Hi Dr. Emiliano Carrera,  I noticed a freckle I have had for a long time that seems to have gotten bigger and I would like to request a referral to a dermatologist to get it checked out. Please let me know if this is something I would need an office visit for.   Thank you,  Becca Rivera

## 2023-08-17 ENCOUNTER — OFFICE VISIT (OUTPATIENT)
Dept: FAMILY MEDICINE CLINIC | Facility: CLINIC | Age: 23
End: 2023-08-17
Payer: COMMERCIAL

## 2023-08-17 VITALS
BODY MASS INDEX: 19.4 KG/M2 | TEMPERATURE: 97 F | WEIGHT: 128 LBS | OXYGEN SATURATION: 98 % | RESPIRATION RATE: 16 BRPM | HEART RATE: 89 BPM | DIASTOLIC BLOOD PRESSURE: 68 MMHG | HEIGHT: 68 IN | SYSTOLIC BLOOD PRESSURE: 114 MMHG

## 2023-08-17 DIAGNOSIS — E55.9 VITAMIN D DEFICIENCY: ICD-10-CM

## 2023-08-17 DIAGNOSIS — L98.8 SKIN LESION OF BREAST: ICD-10-CM

## 2023-08-17 DIAGNOSIS — N94.6 DYSMENORRHEA: ICD-10-CM

## 2023-08-17 DIAGNOSIS — Z01.419 WELL WOMAN EXAM WITH ROUTINE GYNECOLOGICAL EXAM: Primary | ICD-10-CM

## 2023-08-17 DIAGNOSIS — Z00.00 LABORATORY EXAM ORDERED AS PART OF ROUTINE GENERAL MEDICAL EXAMINATION: ICD-10-CM

## 2023-08-17 PROCEDURE — 3078F DIAST BP <80 MM HG: CPT | Performed by: FAMILY MEDICINE

## 2023-08-17 PROCEDURE — 87491 CHLMYD TRACH DNA AMP PROBE: CPT | Performed by: FAMILY MEDICINE

## 2023-08-17 PROCEDURE — 88175 CYTOPATH C/V AUTO FLUID REDO: CPT | Performed by: FAMILY MEDICINE

## 2023-08-17 PROCEDURE — 3074F SYST BP LT 130 MM HG: CPT | Performed by: FAMILY MEDICINE

## 2023-08-17 PROCEDURE — 87591 N.GONORRHOEAE DNA AMP PROB: CPT | Performed by: FAMILY MEDICINE

## 2023-08-17 PROCEDURE — 99213 OFFICE O/P EST LOW 20 MIN: CPT | Performed by: FAMILY MEDICINE

## 2023-08-17 PROCEDURE — 99395 PREV VISIT EST AGE 18-39: CPT | Performed by: FAMILY MEDICINE

## 2023-08-17 PROCEDURE — 3008F BODY MASS INDEX DOCD: CPT | Performed by: FAMILY MEDICINE

## 2023-08-18 LAB
C TRACH DNA SPEC QL NAA+PROBE: NEGATIVE
N GONORRHOEA DNA SPEC QL NAA+PROBE: NEGATIVE

## 2023-08-21 PROCEDURE — 88305 TISSUE EXAM BY PATHOLOGIST: CPT | Performed by: DERMATOLOGY

## 2023-08-22 ENCOUNTER — LAB ENCOUNTER (OUTPATIENT)
Dept: LAB | Age: 23
End: 2023-08-22
Attending: FAMILY MEDICINE
Payer: COMMERCIAL

## 2023-08-22 ENCOUNTER — LAB REQUISITION (OUTPATIENT)
Dept: LAB | Facility: HOSPITAL | Age: 23
End: 2023-08-22
Payer: COMMERCIAL

## 2023-08-22 DIAGNOSIS — D48.5 NEOPLASM OF UNCERTAIN BEHAVIOR OF SKIN: ICD-10-CM

## 2023-08-22 DIAGNOSIS — E55.9 VITAMIN D DEFICIENCY: ICD-10-CM

## 2023-08-22 DIAGNOSIS — Z00.00 LABORATORY EXAM ORDERED AS PART OF ROUTINE GENERAL MEDICAL EXAMINATION: ICD-10-CM

## 2023-08-22 LAB
ALBUMIN SERPL-MCNC: 4.1 G/DL (ref 3.4–5)
ALBUMIN/GLOB SERPL: 1.5 {RATIO} (ref 1–2)
ALP LIVER SERPL-CCNC: 53 U/L
ALT SERPL-CCNC: 26 U/L
ANION GAP SERPL CALC-SCNC: 6 MMOL/L (ref 0–18)
AST SERPL-CCNC: 13 U/L (ref 15–37)
BASOPHILS # BLD AUTO: 0.02 X10(3) UL (ref 0–0.2)
BASOPHILS NFR BLD AUTO: 0.5 %
BILIRUB SERPL-MCNC: 0.4 MG/DL (ref 0.1–2)
BUN BLD-MCNC: 10 MG/DL (ref 7–18)
CALCIUM BLD-MCNC: 9 MG/DL (ref 8.5–10.1)
CHLORIDE SERPL-SCNC: 109 MMOL/L (ref 98–112)
CHOLEST SERPL-MCNC: 188 MG/DL (ref ?–200)
CO2 SERPL-SCNC: 24 MMOL/L (ref 21–32)
CREAT BLD-MCNC: 0.74 MG/DL
EGFRCR SERPLBLD CKD-EPI 2021: 117 ML/MIN/1.73M2 (ref 60–?)
EOSINOPHIL # BLD AUTO: 0.13 X10(3) UL (ref 0–0.7)
EOSINOPHIL NFR BLD AUTO: 3 %
ERYTHROCYTE [DISTWIDTH] IN BLOOD BY AUTOMATED COUNT: 12.1 %
FASTING PATIENT LIPID ANSWER: YES
FASTING STATUS PATIENT QL REPORTED: YES
GLOBULIN PLAS-MCNC: 2.7 G/DL (ref 2.8–4.4)
GLUCOSE BLD-MCNC: 87 MG/DL (ref 70–99)
HCT VFR BLD AUTO: 36.2 %
HDLC SERPL-MCNC: 79 MG/DL (ref 40–59)
HGB BLD-MCNC: 12.2 G/DL
IMM GRANULOCYTES # BLD AUTO: 0.01 X10(3) UL (ref 0–1)
IMM GRANULOCYTES NFR BLD: 0.2 %
LDLC SERPL CALC-MCNC: 99 MG/DL (ref ?–100)
LYMPHOCYTES # BLD AUTO: 1.68 X10(3) UL (ref 1–4)
LYMPHOCYTES NFR BLD AUTO: 38.9 %
MCH RBC QN AUTO: 31.6 PG (ref 26–34)
MCHC RBC AUTO-ENTMCNC: 33.7 G/DL (ref 31–37)
MCV RBC AUTO: 93.8 FL
MONOCYTES # BLD AUTO: 0.35 X10(3) UL (ref 0.1–1)
MONOCYTES NFR BLD AUTO: 8.1 %
NEUTROPHILS # BLD AUTO: 2.13 X10 (3) UL (ref 1.5–7.7)
NEUTROPHILS # BLD AUTO: 2.13 X10(3) UL (ref 1.5–7.7)
NEUTROPHILS NFR BLD AUTO: 49.3 %
NONHDLC SERPL-MCNC: 109 MG/DL (ref ?–130)
OSMOLALITY SERPL CALC.SUM OF ELEC: 286 MOSM/KG (ref 275–295)
PLATELET # BLD AUTO: 248 10(3)UL (ref 150–450)
POTASSIUM SERPL-SCNC: 4.6 MMOL/L (ref 3.5–5.1)
PROT SERPL-MCNC: 6.8 G/DL (ref 6.4–8.2)
RBC # BLD AUTO: 3.86 X10(6)UL
SODIUM SERPL-SCNC: 139 MMOL/L (ref 136–145)
TRIGL SERPL-MCNC: 50 MG/DL (ref 30–149)
TSI SER-ACNC: 1.37 MIU/ML (ref 0.36–3.74)
VIT D+METAB SERPL-MCNC: 23.7 NG/ML (ref 30–100)
VLDLC SERPL CALC-MCNC: 8 MG/DL (ref 0–30)
WBC # BLD AUTO: 4.3 X10(3) UL (ref 4–11)

## 2023-08-22 PROCEDURE — 82306 VITAMIN D 25 HYDROXY: CPT

## 2023-08-22 PROCEDURE — 84443 ASSAY THYROID STIM HORMONE: CPT

## 2023-08-22 PROCEDURE — 36415 COLL VENOUS BLD VENIPUNCTURE: CPT

## 2023-08-22 PROCEDURE — 85025 COMPLETE CBC W/AUTO DIFF WBC: CPT

## 2023-08-22 PROCEDURE — 80053 COMPREHEN METABOLIC PANEL: CPT

## 2023-08-22 PROCEDURE — 80061 LIPID PANEL: CPT

## 2023-09-06 ENCOUNTER — MED REC SCAN ONLY (OUTPATIENT)
Dept: FAMILY MEDICINE CLINIC | Facility: CLINIC | Age: 23
End: 2023-09-06

## 2024-01-24 NOTE — TELEPHONE ENCOUNTER
naproxen 500 MG Oral Tab          Possible duplicate: Aguilar to review recent actions on this medication    Sig: Take 1 tablet (500 mg total) by mouth 2 (two) times daily with meals. Take for day 1-2 of your period    Disp: 30 tablet    Refills: 0    Start: 1/23/2024    Class: Normal       LOV  8/17/23     LAST LAB n/a     LAST RX  6/6/23 30     Next OV No future appointments.      PROTOCOL none

## 2024-01-25 RX ORDER — NAPROXEN 500 MG/1
TABLET ORAL
Qty: 30 TABLET | Refills: 0 | OUTPATIENT
Start: 2024-01-25

## 2024-01-25 RX ORDER — NAPROXEN 500 MG/1
500 TABLET ORAL 2 TIMES DAILY WITH MEALS
Qty: 30 TABLET | Refills: 0 | Status: SHIPPED | OUTPATIENT
Start: 2024-01-25 | End: 2024-01-25

## 2024-01-26 NOTE — TELEPHONE ENCOUNTER
naproxen 500 MG Oral Tab          Possible duplicate: Aguilar to review recent actions on this medication    Sig: Take 1 tablet (500 mg total) by mouth 2 (two) times daily with meals. Take for day 1-2 of your period    Disp: 30 tablet    Refills: 0    Start: 1/25/2024    Class: Normal       New pharmacy  [facility]

## 2024-01-29 RX ORDER — NAPROXEN 500 MG/1
500 TABLET ORAL 2 TIMES DAILY WITH MEALS
Qty: 30 TABLET | Refills: 0 | Status: SHIPPED | OUTPATIENT
Start: 2024-01-29

## 2024-06-20 ENCOUNTER — OFFICE VISIT (OUTPATIENT)
Dept: FAMILY MEDICINE CLINIC | Facility: CLINIC | Age: 24
End: 2024-06-20

## 2024-06-20 ENCOUNTER — HOSPITAL ENCOUNTER (OUTPATIENT)
Dept: GENERAL RADIOLOGY | Age: 24
Discharge: HOME | End: 2024-06-20
Attending: FAMILY MEDICINE

## 2024-06-20 VITALS
SYSTOLIC BLOOD PRESSURE: 104 MMHG | HEART RATE: 79 BPM | BODY MASS INDEX: 19.59 KG/M2 | RESPIRATION RATE: 16 BRPM | OXYGEN SATURATION: 99 % | HEIGHT: 68 IN | WEIGHT: 129.25 LBS | DIASTOLIC BLOOD PRESSURE: 62 MMHG | TEMPERATURE: 98 F

## 2024-06-20 DIAGNOSIS — G89.29 CHRONIC BILATERAL LOW BACK PAIN WITHOUT SCIATICA: Primary | ICD-10-CM

## 2024-06-20 DIAGNOSIS — M54.50 CHRONIC BILATERAL LOW BACK PAIN WITHOUT SCIATICA: Primary | ICD-10-CM

## 2024-06-20 DIAGNOSIS — M54.50 CHRONIC BILATERAL LOW BACK PAIN WITHOUT SCIATICA: ICD-10-CM

## 2024-06-20 DIAGNOSIS — G89.29 CHRONIC BILATERAL LOW BACK PAIN WITHOUT SCIATICA: ICD-10-CM

## 2024-06-20 PROCEDURE — 99214 OFFICE O/P EST MOD 30 MIN: CPT | Performed by: FAMILY MEDICINE

## 2024-06-20 PROCEDURE — 3008F BODY MASS INDEX DOCD: CPT | Performed by: FAMILY MEDICINE

## 2024-06-20 PROCEDURE — 3074F SYST BP LT 130 MM HG: CPT | Performed by: FAMILY MEDICINE

## 2024-06-20 PROCEDURE — 72110 X-RAY EXAM L-2 SPINE 4/>VWS: CPT | Performed by: FAMILY MEDICINE

## 2024-06-20 PROCEDURE — 3078F DIAST BP <80 MM HG: CPT | Performed by: FAMILY MEDICINE

## 2024-06-20 RX ORDER — CYCLOBENZAPRINE HCL 5 MG
5 TABLET ORAL NIGHTLY PRN
Qty: 30 TABLET | Refills: 0 | Status: SHIPPED | OUTPATIENT
Start: 2024-06-20

## 2024-06-20 NOTE — PROGRESS NOTES
Anesthesia Volume In Cc: 5 HPI:   Haily Wesley is a 23 year old female who is here for complaints of back pain.  Pain is located at low back. Pain is described as aching. Severity is mild, moderate. The pain radiates to no radiation of pain. 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 Has had for the past year but worse over the past 5mo. Pain is worsened by prolonged standing, Gets relief of back pain with lying down, massage and ibuprofen helps a little. Prior back pain hx: no prior back problems. Denies any paresthesias, weakness of loss of bowel/bladder function.  Recently graduated with a degree in science education and looking for a job as a .   Menses regular, LMP 6/17.    Current Outpatient Medications   Medication Sig Dispense Refill    naproxen 500 MG Oral Tab Take 1 tablet (500 mg total) by mouth 2 (two) times daily with meals. Take for day 1-2 of your period 30 tablet 0    ondansetron (ZOFRAN) 4 mg tablet Take 1 tablet (4 mg total) by mouth every 8 (eight) hours as needed for Nausea (during menstrual cycle). 20 tablet 0    ergocalciferol 94787 units Oral Cap Take 1 capsule (50,000 Units total) by mouth once a week.  0    Multiple Vitamins-Minerals (MULTI VITAMIN/MINERALS) Oral Tab Take by mouth daily.       Past Medical History:    ASTHMA    has been fine since age 5-6 years    Asthma (HCC)    I have not had an asthma attack in over 10 years       Past Surgical History:   Procedure Laterality Date    Egd N/A 2/27/2017    Procedure: ESOPHAGOGASTRODUODENOSCOPY (EGD);  Surgeon: Emerson Mckeon MD;  Location:  ENDOSCOPY       Family History   Problem Relation Age of Onset    Mental Disorder Father         bipolar    High Cholesterol Father     Other (Other) Mother         migraines    High Cholesterol Maternal Grandmother     High Blood Pressure Maternal Grandmother     Other (Other) Maternal Grandmother         seizure disorder, migraines, osteoporosis    High  Cholesterol Maternal Grandfather     High Blood Pressure Maternal Grandfather     Cancer Paternal Grandmother         HX uterine CA, had hysterectomy and radiation, in remission    Diabetes Sister         type 1     SOCIAL HISTORY:  Social History     Socioeconomic History    Marital status: Single   Tobacco Use    Smoking status: Never     Passive exposure: Never    Smokeless tobacco: Never   Vaping Use    Vaping status: Never Used   Substance and Sexual Activity    Alcohol use: Yes     Alcohol/week: 5.0 standard drinks of alcohol     Types: 5 Standard drinks or equivalent per week     Comment: Usually closer to 3 drinks a week, max is 5    Drug use: No    Sexual activity: Not Currently   Other Topics Concern    Caffeine Concern Yes    Exercise Yes    Seat Belt Yes    Special Diet Yes     Comment: Gluten free diet    Stress Concern No    Weight Concern No     Social Determinants of Health      Received from [facility]     Food Insecurities    Received from [facility]     Transportation    Received from [facility]     Housing/Utilities        REVIEW OF SYSTEMS:   GENERAL: feels well otherwise  SKIN: denies any unusual skin lesions  LUNGS: denies shortness of breath with exertion  CARDIOVASCULAR: denies chest pain on exertion  GI: denies abdominal pain,denies heartburn  MUSCULOSKELETAL: no other joints are affected    EXAM:   /62   Pulse 79   Temp 98.4 °F (36.9 °C) (Temporal)   Resp 16   Ht 5' 8\" (1.727 m)   Wt 129 lb 4 oz (58.6 kg)   LMP 06/17/2024   SpO2 99%   BMI 19.65 kg/m²    GENERAL: well developed, well nourished,in no apparent distress  SKIN: no rashes,no suspicious lesions  NECK: supple   LUNGS: clear to auscultation, no w/r/r  CARDIO: RRR without murmur   EXTREMITIES: no cyanosis, clubbing or edema  BACK: lumbosacral tenderness, no sciatic tenderness, is able to flex 90 degrees, DTR's are 2+ bilaterally, strength is 5+/5, sensation is intact, pt is able to toe and heel walk without difficulty,  negative straight leg bilat    ASSESSMENT/PLAN:   Haily Wesley is a 23 year old female who presents with:  1. Chronic bilateral low back pain without sciatica  - advised symptomatic tx: ice/heat, nsaids prn and HEP  - will check XR of l-spine  - refer to PT   - advised to take naproxen prn and will provide rx for flexeril, reviewed indications, dosing and SEs  - monitor symptoms  - she understands and agrees with tx plan  - RTC for annual physical in August, sooner if needed  - XR LUMBAR SPINE (MIN 4 VIEWS) (CPT=72110); Future  - Physical Therapy Referral - [facility]  - cyclobenzaprine 5 MG Oral Tab; Take 1 tablet (5 mg total) by mouth nightly as needed for Muscle spasms.  Dispense: 30 tablet; Refill: 0    The patient indicates understanding of these issues and agrees to the plan.  The patient is asked to return if sx's persist or worsen.

## 2024-07-16 ENCOUNTER — TELEPHONE (OUTPATIENT)
Dept: PHYSICAL THERAPY | Facility: HOSPITAL | Age: 24
End: 2024-07-16

## 2024-07-17 ENCOUNTER — OFFICE VISIT (OUTPATIENT)
Dept: PHYSICAL THERAPY | Facility: HOSPITAL | Age: 24
End: 2024-07-17
Attending: FAMILY MEDICINE
Payer: COMMERCIAL

## 2024-07-17 DIAGNOSIS — M54.50 CHRONIC BILATERAL LOW BACK PAIN WITHOUT SCIATICA: Primary | ICD-10-CM

## 2024-07-17 DIAGNOSIS — G89.29 CHRONIC BILATERAL LOW BACK PAIN WITHOUT SCIATICA: Primary | ICD-10-CM

## 2024-07-17 PROCEDURE — 97110 THERAPEUTIC EXERCISES: CPT

## 2024-07-17 PROCEDURE — 97161 PT EVAL LOW COMPLEX 20 MIN: CPT

## 2024-07-17 NOTE — PROGRESS NOTES
SPINE EVALUATION:     Diagnosis:   Chronic LB pain without Sciatica      Referring Provider: Duarte Lebron  Date of Evaluation:    7/17/2024    Precautions:  None Next MD visit:   none scheduled  Date of Surgery: n/a     PATIENT SUMMARY   Haily Wesley is a 23 year old female who presents to therapy today with complaints of LB pain which started about 2 years ago , more noticeable in the past 6 months. Pain is felt with prolonged standing and sitting. Any increase in activity increases her back pain.Lying down decreases the pain. She describes pain as dull, achy pain, tight. Denies numbness/tingling on LE's.   Pt describes pain level current 1 /10, at best 0/10, at worst 6/10.   Current functional limitations include limited standing, sitting and walking activities.     Haily describes prior level of function independent . Pt goals include pain relief, be able to do all activities without pain..  Past medical history was reviewed with Haily.   Pt denies diplopia, dysarthria, dysphasia, dizziness, drop attacks, bowel/bladder changes, saddle anesthesia, and LAWRENCE LE N/T.    ASSESSMENT  Haily presents to physical therapy evaluation with primary c/o chronic LB pain. The results of the objective tests and measures show poor posture and poor trunk control/activation. Functional deficits include but are not limited to prolonged standing, sitting and walking.  Signs and symptoms are consistent with diagnosis of low back pain. Pt and PT discussed evaluation findings, pathology, POC and HEP.  Pt voiced understanding and performs HEP correctly without reported pain. Skilled Physical Therapy is medically necessary to address the above impairments and reach functional goals.     OBJECTIVE:   Observation:   R ASIS is slightly depressed with RLE slightly longer than the LLE.   Moderate thoracic spine tightness / restriction, minimal lumbar spine/ restriction  During trunk flexion, most of the movement occurred in the  thoracic spine  Poor core activation but able to demonstrate good activation with verbal and tactile cues  Posture:   In sitting, pt is biased to hunching shoulders, both thighs adducted. She admits to sitting in the slumped position.     Palpation:   Denies any tenderness with palpation  Neuro Screen: (-)       AROM: (* denotes performed with pain)  Flexion: 60  Extension: 25  Sidebending: R 30; L 30  Rotation: R wnl; L wnl    Accessory motion: NA      Strength: (* denotes performed with pain)  4+/5 on BLE, trunk strength 3+/5  Flexibility:   Tightness of B pectoral muscles  Special tests:   (-) SLR    Gait: pt ambulates on level ground with normal mechanics.  Balance: NA    Today’s Treatment and Response:   Pt education was provided on exam findings, treatment diagnosis, treatment plan, expectations, and prognosis. Pt was also provided recommendations for postural corrections, importance of posture for long term back health.  She performed slouch/correct exercises on Dynadisc, eyes open/closed for propioceptive retraining. Performed prone elbow press with gentle thoracic overpressure.  Patient was instructed in and issued a HEP for:   Access Code: [email or web address]  URL: [email or web address]  Date: 07/17/2024  Prepared by: Tavia Ballard    Exercises  - Slouch Overcorrect on Swiss Ball  - 3 x daily - 1 sets - 10 reps    Charges: PT Eval Low Complexity, 10 min TE      Total Timed Treatment: 10 min     Total Treatment Time: 45 min     Based on clinical rationale and outcome measures, this evaluation involved Low Complexity decision making due to 1-2 personal factors/comorbidities, 3 body structures involved/activity limitations, and evolving symptoms including changing pain levels.  PLAN OF CARE:    Goals: (to be met in 6 visits)     Pt will demonstrate improved sitting and standing posture to minimize back stress  Pt will report LB pain no worse than 1-2/10 when standing for 3-4 hours  Pt will be  independent with HEP to maintain gains achieved in PT  Pt will improve core activation for improved back mechanics  2.      Frequency / Duration: Patient will be seen for 1-2 x/week or a total of 6 visits over a 90 day period. Treatment will include: Manual Therapy, Therapeutic exercises, HEP, modalities as needed    Education or treatment limitation: None  Rehab Potential:good    Prescore KAYLIN:   8%    Patient/Family/Caregiver was advised of these findings, precautions, and treatment options and has agreed to actively participate in planning and for this course of care.    Thank you for your referral. Please co-sign or sign and return this letter via fax as soon as possible to [phone]. If you have any questions, please contact me at Dept: [phone]    Sincerely,  Electronically signed by therapist: Tavia Ballard, PT    Physician's certification required: Yes  I certify the need for these services furnished under this plan of treatment and while under my care.    X___________________________________________________ Date____________________    Certification From: 7/17/2024  To:10/15/2024

## 2024-07-24 ENCOUNTER — OFFICE VISIT (OUTPATIENT)
Dept: PHYSICAL THERAPY | Facility: HOSPITAL | Age: 24
End: 2024-07-24
Attending: FAMILY MEDICINE
Payer: COMMERCIAL

## 2024-07-24 PROCEDURE — 97110 THERAPEUTIC EXERCISES: CPT

## 2024-07-24 NOTE — PROGRESS NOTES
Diagnosis:   Chronic LB pain without sciatica      Referring Provider: Duarte Lebron  Date of Evaluation:    07/17/2024    Precautions:  None Next MD visit:   none scheduled  Date of Surgery: n/a   Insurance Primary/Secondary: [place] / N/A     # Auth Visits: 5            Subjective: Back feels a little better, more aware of posture    Pain: 0/10      Objective: No tenderness on palpation of the mid and lower back  She needed verbal cues to move through lumbar spine rather than at TL junction. She needed verbal and tactile cues to find neutral pelvis position in sitting.  Educated on work ergonomics  and she verbalized understanding      Assessment: Pt needed verbal and tactile cues for good back mechanics and posture. There was no tenderness on mid back and LB areas      Goals:   Pt will demonstrate improved sitting and standing posture to minimize back stress  Pt will report LB pain no worse than 1-2/10 when standing for 3-4 hours  Pt will be independent with HEP to maintain gains achieved in PT  Pt will improve core activation for improved back mechanics      Plan: Continue PT, working on trunk stabilization, trunk strengthening and posture  Date: 7/24/2024  TX#: 2/5 Date:                 TX#: 3/ Date:                 TX#: 4/ Date:                 TX#: 5/ Date:   Tx#: 6/   Thera ex:   Seated trunk flexion, R/L lateral flex stretch with Swiss ball 5 reps each   Sitting on Swiss ball: slouch/correct, pelvic rotation cw and ccw, lateral pelvic tilts, partial roll ups  Bird dog x 10  Cat cow x 10  H/L DKTC with ball between knees in neutral spine  H/L anti trunk rotation using red band 10 reps R and L  Partial trunk rotation with Red band 10 reps R and L  Bridge x 10  Forward lunges with blue band held at shoulder level for trunk resistance x 10  Standing anti rotation side stepping against double red band 10 reps R and L  Brief stm to mid and LB areas                      HEP:   Access Code:  [email or web address]  URL: [email or web address]  Date: 07/24/2024  Prepared by: Tavia Ballard    Exercises  - Cat Cow  - 2 x daily - 1 sets - 10 reps  - Bird Dog  - 2 x daily - 1 sets - 10 reps  - Supine Double Knee to Chest  - 2 x daily - 1 sets - 10 reps    Charges: 40min TE 5 min MT       Total Timed Treatment: 45 min  Total Treatment Time: 45 min

## 2024-07-31 ENCOUNTER — OFFICE VISIT (OUTPATIENT)
Dept: PHYSICAL THERAPY | Facility: HOSPITAL | Age: 24
End: 2024-07-31
Attending: FAMILY MEDICINE
Payer: COMMERCIAL

## 2024-07-31 PROCEDURE — 97110 THERAPEUTIC EXERCISES: CPT

## 2024-07-31 NOTE — PROGRESS NOTES
Diagnosis:   Chronic LB pain without sciatica      Referring Provider: Duarte Lebron  Date of Evaluation:    07/17/2024    Precautions:  None Next MD visit:   none scheduled  Date of Surgery: n/a   Insurance Primary/Secondary: [place] / N/A     # Auth Visits: 5            Subjective: Back feels better, pain is not as severe. LB pain does not linger, resolves fairly quickly with rest  Pain: 0/10      Objective:  Observed better posture , less hunching of the shoulders . See grid    Assessment: Pt needed verbal and tactile cues for good back mechanics and posture. There was no tenderness on mid back and LB areas  Trunk ROM is wnl and painfree, some tightness of hamstrings . Pain is stable.      Goals:   Pt will demonstrate improved sitting and standing posture to minimize back stress-progressing  Pt will report LB pain no worse than 1-2/10 when standing for 3-4 hours-continue  Pt will be independent with HEP to maintain gains achieved in PT-progressing  Pt will improve core activation for improved back mechanics-progressing      Plan: Continue PT, working on trunk stabilization, trunk strengthening and posture  Date: 7/24/2024  TX#: 2/5 Date:   731/2024              TX#: 3/5 Date:                 TX#: 4/ Date:                 TX#: 5/ Date:   Tx#: 6/   Thera ex:   Seated trunk flexion, R/L lateral flex stretch with Swiss ball 5 reps each   Sitting on Swiss ball: slouch/correct, pelvic rotation cw and ccw, lateral pelvic tilts, partial roll ups  Bird dog x 10  Cat cow x 10  H/L DKTC with ball between knees in neutral spine  H/L anti trunk rotation using red band 10 reps R and L  Partial trunk rotation with Red band 10 reps R and L  Bridge x 10  Forward lunges with blue band held at shoulder level for trunk resistance x 10  Standing anti rotation side stepping against double red band 10 reps R and L  Brief stm to mid and LB areas         Thera EX  Hamstrings stretches 15 sec hold x 3  DKTC with ball between  the knees   Bird dog x 10  Modified plank- hold for 15 sec x 3    H/L anti trunk rotation using green band 10 reps R and L  Partial lower trunk rotation against Green band 10 reps R and L  Bridge with ball between knees  Forward lunges with blue band held at shoulder level for trunk resistance x 10  Standing anti rotation side stepping against double red band 10 reps R and L  STM to mid and low back areas             HEP:   Continue with HEP    Charges: 40min TE 5 min MT       Total Timed Treatment: 45 min  Total Treatment Time: 45 min

## 2024-08-07 ENCOUNTER — OFFICE VISIT (OUTPATIENT)
Dept: PHYSICAL THERAPY | Facility: HOSPITAL | Age: 24
End: 2024-08-07
Attending: FAMILY MEDICINE
Payer: COMMERCIAL

## 2024-08-07 PROCEDURE — 97110 THERAPEUTIC EXERCISES: CPT

## 2024-08-07 NOTE — PROGRESS NOTES
Diagnosis:   Chronic LB pain without sciatica      Referring Provider: Duarte Lebron  Date of Evaluation:    07/17/2024    Precautions:  None Next MD visit:   none scheduled  Date of Surgery: n/a   Ins[place] Primary/Secondary[place] / N/[place]     # Auth Visits: 5            Subjective: Pt went on vacation for 3 days and had no pain. Tday she woke up with LB pressure but this resolved farily quickly. She is more aware of her sitting posture and is more comfortable with the sitting position.  Pain: 0/10      Objective:  see grid  Trunk ROM is wnl and painfree  Denies LE symptoms  Gait is within the norm    Assessment: Pt needed verbal and tactile cues for neutral spine. She is reporting less pain overall and does not appear to have increased pain with increased activity. Discussed poc with pt- next visit will be her last and she will continue HEP on her own.  Goals:   Pt will demonstrate improved sitting and standing posture to minimize back stress-progressing  Pt will report LB pain no worse than 1-2/10 when standing for 3-4 hours-continue  Pt will be independent with HEP to maintain gains achieved in PT-progressing  Pt will improve core activation for improved back mechanics-progressing      Plan: Continue PT, working on trunk stabilization, trunk strengthening and posture  Date: 7/24/2024  TX#: 2/5 Date:   731/2024              TX#: 3/5 Date:     8/7/2024            TX#: 4/5 Date:               TX#: 5/ Date:   Tx#: 6/   Thera ex:   Seated trunk flexion, R/L lateral flex stretch with Swiss ball 5 reps each   Sitting on Swiss ball: slouch/correct, pelvic rotation cw and ccw, lateral pelvic tilts, partial roll ups  Bird dog x 10  Cat cow x 10  H/L DKTC with ball between knees in neutral spine  H/L anti trunk rotation using red band 10 reps R and L  Partial trunk rotation with Red band 10 reps R and L  Bridge x 10  Forward lunges with blue band held at shoulder level for trunk resistance x 10  Standing anti  rotation side stepping against double red band 10 reps R and L  Brief stm to mid and LB areas         Thera EX  Hamstrings stretches 15 sec hold x 3  DKTC with ball between the knees   Bird dog x 10  Modified plank- hold for 15 sec x 3    H/L anti trunk rotation using green band 10 reps R and L  Partial lower trunk rotation against Green band 10 reps R and L  Bridge with ball between knees  Forward lunges with blue band held at shoulder level for trunk resistance x 10  Standing anti rotation side stepping against double red band 10 reps R and L  STM to mid and low back areas Thera EX    DKTC with ball between the knees   Bird dog x 10  Modified plank- hold for 15 sec x 3  Cat/cow  Dead bug  Partial wall slides with SB  Monster walk 10 steps x 3 rounds with red band    H/L anti trunk rotation using green band 10 reps R and L  Partial lower trunk rotation against Green band 10 reps R and L  Bridge with ball between knees  Forward lunges with blue band held at shoulder level for trunk resistance x 10  Standing anti rotation side stepping against double red band 10 reps R and L  STM to mid and low back areas  CP applied to LB            HEP:   Continue with HEP    Charges: 35 min TE 5 min MT       Total Timed Treatment: 40 min  Total Treatment Time: 40 min

## 2024-08-14 ENCOUNTER — TELEPHONE (OUTPATIENT)
Dept: PHYSICAL THERAPY | Facility: HOSPITAL | Age: 24
End: 2024-08-14

## 2024-08-14 ENCOUNTER — OFFICE VISIT (OUTPATIENT)
Dept: PHYSICAL THERAPY | Facility: HOSPITAL | Age: 24
End: 2024-08-14
Attending: FAMILY MEDICINE
Payer: COMMERCIAL

## 2024-08-14 PROCEDURE — 97110 THERAPEUTIC EXERCISES: CPT

## 2024-08-14 NOTE — PROGRESS NOTES
Diagnosis:   Chronic LB pain without sciatica      Referring Provider: Duatre Lebron  Date of Evaluation:    07/17/2024    Precautions:  None Next MD visit:   none scheduled  Date of Surgery: n/a   Insurance Primary/Secondary: [place] / N/A     # Auth Visits: 5           Discharge Summary    Pt has attended 5 visits in Physical Therapy.      Subjective: Pain has been occasional, standing can still produce pain at 2-4/10 but with postural correction it goes away fairly quickly. Pain in sitting has also decreased and managed by posture correction.  Pain: 0/10      Objective:  see grid  Trunk ROM is wnl and painfree. No tenderness on LB, very minimal tautness at thoracic level  Denies LE symptoms  Gait is within the norm  Discussed importance of positioning and good LE alignment, to maintain good ergonomics at work and to always use good lifting mechanics( bend at knees, no twisting, know her load capacity) and she verbalized understanding.    Assessment: Pt is now able to self manage her symptoms and she is ready for discharge. She demonstrates improved sitting and standing posture. She verbalized understanding of ergonomic and lifting mechanics. She is independent with HEP.    Goals:   Pt will demonstrate improved sitting and standing posture to minimize back stress-met  Pt will report LB pain no worse than 1-2/10 when standing for 3-4 hours-partially met, anticipate this to be met with continued self managementmet  Pt will be independent with HEP to maintain gains achieved in PT-met  Pt will improve core activation for improved back mechanics-met    Date: 7/24/2024  TX#: 2/5 Date:   731/2024              TX#: 3/5 Date:     8/7/2024            TX#: 4/5 Date:          8/14/2024     TX#: 5/5 Date:   Tx#: 6/   Thera ex:   Seated trunk flexion, R/L lateral flex stretch with Swiss ball 5 reps each   Sitting on Swiss ball: slouch/correct, pelvic rotation cw and ccw, lateral pelvic tilts, partial roll ups  Bird  dog x 10  Cat cow x 10  H/L DKTC with ball between knees in neutral spine  H/L anti trunk rotation using red band 10 reps R and L  Partial trunk rotation with Red band 10 reps R and L  Bridge x 10  Forward lunges with blue band held at shoulder level for trunk resistance x 10  Standing anti rotation side stepping against double red band 10 reps R and L  Brief stm to mid and LB areas         Thera EX  Hamstrings stretches 15 sec hold x 3  DKTC with ball between the knees   Bird dog x 10  Modified plank- hold for 15 sec x 3    H/L anti trunk rotation using green band 10 reps R and L  Partial lower trunk rotation against Green band 10 reps R and L  Bridge with ball between knees  Forward lunges with blue band held at shoulder level for trunk resistance x 10  Standing anti rotation side stepping against double red band 10 reps R and L  STM to mid and low back areas Thera EX    DKTC with ball between the knees   Bird dog x 10  Modified plank- hold for 15 sec x 3  Cat/cow  Dead bug  Partial wall slides with SB  Monster walk 10 steps x 3 rounds with red band    H/L anti trunk rotation using green band 10 reps R and L  Partial lower trunk rotation against Green band 10 reps R and L  Bridge with ball between knees  Forward lunges with blue band held at shoulder level for trunk resistance x 10  Standing anti rotation side stepping against double red band 10 reps R and L  STM to mid and low back areas  CP applied to LB Thera Ex     DKTC with ball between the knees   Bird dog x 10  Modified plank- hold for 15 sec x 3  Side plank on elbow R and L 10 sec hold x 2  H/L pelvic tilts  H/L anti rotation trunk exs against blue band 10 reps each side  Seated on SB: pelvic rotation cw and ccw, partial rolling sit ups, lateral pelvic tilts  Pectoral doorway stretch   UT stretch            HEP: Access Code: [email or web address]  URL: [email or web address]  Date: 08/14/2024  Prepared by: Tavia Ballard    Exercises  - Side Plank on  Elbow  - 1 x daily - 1 sets - 2 reps - 10-15 ct hold  - Doorway Pec Stretch at 60 Elevation  - 2 x daily - 1 sets - 2-3 reps - 10-15 hold  - Upper Trapezius Stretch  - 1 x daily - 1 sets - 2-3 reps - 10-15 hold  Continue with HEP  Post Oswestry Disability Index Score  Post Score: 4 % (8/14/2024 12:31 PM)    4 % improvement    Plan: Pt will now transition to HEP, discharge from OP PT    Thank you for your referral. If you have any questions, please contact me at Dept: [phone].      Charges: 40  min TE       Total Timed Treatment: 40 min  Total Treatment Time: 40 min

## 2024-09-12 ENCOUNTER — HOSPITAL ENCOUNTER (OUTPATIENT)
Age: 24
Discharge: HOME | End: 2024-09-12
Payer: COMMERCIAL

## 2024-09-12 VITALS
TEMPERATURE: 98 F | HEIGHT: 68 IN | DIASTOLIC BLOOD PRESSURE: 57 MMHG | BODY MASS INDEX: 19.7 KG/M2 | SYSTOLIC BLOOD PRESSURE: 96 MMHG | RESPIRATION RATE: 16 BRPM | WEIGHT: 130 LBS | OXYGEN SATURATION: 99 % | HEART RATE: 97 BPM

## 2024-09-12 DIAGNOSIS — U07.1 COVID-19: Primary | ICD-10-CM

## 2024-09-12 LAB
POCT INFLUENZA A: NEGATIVE
POCT INFLUENZA B: NEGATIVE
SARS-COV-2 RNA RESP QL NAA+PROBE: DETECTED

## 2024-09-12 PROCEDURE — 99203 OFFICE O/P NEW LOW 30 MIN: CPT

## 2024-09-12 PROCEDURE — 87502 INFLUENZA DNA AMP PROBE: CPT | Performed by: NURSE PRACTITIONER

## 2024-09-12 PROCEDURE — 99213 OFFICE O/P EST LOW 20 MIN: CPT

## 2024-09-12 RX ORDER — VITAMIN K2 90 MCG
CAPSULE ORAL DAILY
COMMUNITY

## 2024-09-12 NOTE — ED INITIAL ASSESSMENT (HPI)
Patient states on 9/9 she started with a tickle in her throat, 9/10 developed a sore throat, and the last 2 days has had a dry cough, and runny nose. Denies any fevers, chills, N/V/D, or other symptoms. States she has taken ibuprofen PRN but nothing today.

## 2024-09-12 NOTE — ED PROVIDER NOTES
Patient Seen in: [facility]      History     Chief Complaint   Patient presents with    Cold     Sore throat 3 days, cough 2 days, sinus pressure 2 days - Entered by patient     Stated Complaint: Cold - Sore throat 3 days, cough 2 days, sinus pressure 2 days    Subjective:   HPI    Pt is a 23yr old female who is here today with complaints of a sore throat times the last 3 days, 2 days ago started with cough and sinus congestion.  Reports that her dad is ill with the same type of symptoms.  She denies any fevers, chills, nausea, vomiting.  Patient has been eating and drinking without difficulty.    Objective:   Past Medical History:    ASTHMA    has been fine since age 5-6 years    Asthma (HCC)    I have not had an asthma attack in over 10 years              Past Surgical History:   Procedure Laterality Date    Egd N/A 2/27/2017    Procedure: ESOPHAGOGASTRODUODENOSCOPY (EGD);  Surgeon: Emerson Mckeon MD;  Location:  ENDOSCOPY                Social History     Socioeconomic History    Marital status: Single   Tobacco Use    Smoking status: Never     Passive exposure: Never    Smokeless tobacco: Never   Vaping Use    Vaping status: Never Used   Substance and Sexual Activity    Alcohol use: Yes     Alcohol/week: 5.0 standard drinks of alcohol     Types: 5 Standard drinks or equivalent per week     Comment: Usually closer to 3 drinks a week, max is 5    Drug use: No    Sexual activity: Not Currently   Other Topics Concern    Caffeine Concern Yes    Exercise Yes    Seat Belt Yes    Special Diet Yes     Comment: Gluten free diet    Stress Concern No    Weight Concern No     Social Determinants of Health      [facility]     [facility] Insecurities    [facility]     Trans[facility]    [facility]     Housing/Utilities              Review of Systems    Positive for stated Chief Complaint: Cold (Sore throat 3 days, cough 2 days, sinus pressure 2 days - Entered by  patient)    Other systems are as noted in HPI.  Constitutional and vital signs reviewed.      All other systems reviewed and negative except as noted above.    Physical Exam     ED Triage Vitals [09/12/24 1320]   BP 96/57   Pulse 97   Resp 16   Temp 98 °F (36.7 °C)   Temp src Oral   SpO2 99 %   O2 Device None (Room air)       Current Vitals:   Vital Signs  BP: 96/57  Pulse: 97  Resp: 16  Temp: 98 °F (36.7 °C)  Temp src: Oral    Oxygen Therapy  SpO2: 99 %  O2 Device: None (Room air)            Physical Exam    Adult physical exam:     VS: Vital signs reviewed. O2 saturation within normal limits for this patient     General: Patient is awake and alert, oriented to person, place and time. Not in acute distress.      HEENT: Head is normocephalic atraumatic. Pupils reactive bilaterally.  EOMs intact.  . Mucous membranes moist.      Lungs: good inspiratory effort. +air entry bilaterally without wheezes, rhonchi, crackles.  No accessory muscle use or tachypnea.       Extremities: No edema.  Pulses 2+ extremities.   Brisk capillary refill noted.      Skin: Normal skin turgor     CNS: Moves all 4 extremities.  Interacts appropriately.  No tremor.  No gait abnormality          ED Course     Labs Reviewed   RAPID SARS-COV-2 BY PCR - Abnormal; Notable for the following components:       Result Value    Rapid SARS-CoV-2 by PCR Detected (*)     All other components within normal limits   POCT FLU TEST - Normal    Narrative:     This assay is a rapid molecular in vitro test utilizing nucleic acid amplification of influenza A and B viral RNA.             I have personally  reviewed available prior medical records for any recent pertinent discharge summaries/testing. Patient/family updated on results and plan, a verbalized understanding and agreement with the plan.  I explained to the patient that emergent conditions may arise and to go to the ER for new, worsening or any persistent conditions. I've explained the importance of  taking all medicatons as prescribed, follow up, and return precuations,  All questions answered.    Please note that this report has been produced using speech recognition software and may contain errors related to that system including, but not limited to, errors in grammar, punctuation, and spelling, as well as words and phrases that possibly may have been recognized inappropriately.  If there are any questions or concerns, contact the dictating provider for clarification.         MDM      COVID-19 counseling performed.  Patient presents with sore throat, for the last 3 days.  Nontoxic, does not meet SIRS criteria.   Patient does not have uvula deviation or unilateral tonsillar swelling to indicate tonsillar abscess. No meningsmus or trismus. No dysphagia or difficulty handing secretions. No evidence for otitis media. Patient does not have any respiratory distress.  O2 saturation within normal limits for this patient. Does not appear clinically dehydrated and is tolerating oral intake. Presentation consistent with COVID..  Covid test is positive  Encouraged patient on oral hydration and supportive care. Recommend follow up with PCP.  Return to ED precautions discussed with the patient and family.                                   Medical Decision Making      Disposition and Plan     Clinical Impression:  1. COVID-19         Disposition:  Discharge  9/12/2024  1:56 pm    Follow-up:  Duarte Lebron DO  [address] [address]  [phone]                Medications Prescribed:  Discharge Medication List as of 9/12/2024  2:02 PM

## 2024-09-19 RX ORDER — NAPROXEN 500 MG/1
500 TABLET ORAL 2 TIMES DAILY WITH MEALS
Qty: 180 TABLET | Refills: 0 | Status: SHIPPED | OUTPATIENT
Start: 2024-09-19

## 2024-09-19 NOTE — TELEPHONE ENCOUNTER
Refill passed per [facility] protocol.  Requested Prescriptions   Pending Prescriptions Disp Refills    naproxen 500 MG Oral Tab 30 tablet 0     Sig: Take 1 tablet (500 mg total) by mouth 2 (two) times daily with meals. Take for day 1-2 of your period       Non-Narcotic Pain Medication Protocol Passed - 9/15/2024  7:50 PM        Passed - In person appointment or virtual visit in the past 6 mos or appointment in next 3 mos     Recent Outpatient Visits              1 month ago     [facility] Tavia Ballard, PT    Office Visit    1 month ago     [facility] Tavia Ballard, PT    Office Visit    1 month ago     [facility] Tavia Ballard, PT    Office Visit    1 month ago     [facility] Tavia Ballard, PT    Office Visit    2 months ago Chronic bilateral low back pain without sciatica    [facility] Tavia Ballard, PT    Office Visit                         Recent Outpatient Visits              1 month ago     [facility] Tavia Ballard, PT    Office Visit    1 month ago     [facility] Tvaia Ballard, PT    Office Visit    1 month ago     [facility] Tavia Ballard, PT    Office Visit    1 month ago     [facility] Tavia Ballard, PT    Office Visit    2 months ago Chronic bilateral low back pain without sciatica    [facility] Tavia Ballard, PT    Office Visit

## 2024-10-09 ENCOUNTER — OFFICE VISIT (OUTPATIENT)
Dept: FAMILY MEDICINE CLINIC | Facility: CLINIC | Age: 24
End: 2024-10-09
Payer: COMMERCIAL

## 2024-10-09 VITALS
HEIGHT: 68 IN | BODY MASS INDEX: 20.04 KG/M2 | RESPIRATION RATE: 16 BRPM | DIASTOLIC BLOOD PRESSURE: 60 MMHG | OXYGEN SATURATION: 99 % | HEART RATE: 64 BPM | WEIGHT: 132.25 LBS | TEMPERATURE: 98 F | SYSTOLIC BLOOD PRESSURE: 102 MMHG

## 2024-10-09 DIAGNOSIS — D22.9 MULTIPLE NEVI: ICD-10-CM

## 2024-10-09 DIAGNOSIS — Z00.00 LABORATORY EXAM ORDERED AS PART OF ROUTINE GENERAL MEDICAL EXAMINATION: ICD-10-CM

## 2024-10-09 DIAGNOSIS — K90.0 CELIAC DISEASE (HCC): ICD-10-CM

## 2024-10-09 DIAGNOSIS — Z00.00 ROUTINE GENERAL MEDICAL EXAMINATION AT A HEALTH CARE FACILITY: Primary | ICD-10-CM

## 2024-10-09 DIAGNOSIS — E55.9 VITAMIN D DEFICIENCY: ICD-10-CM

## 2024-10-09 DIAGNOSIS — Z23 NEED FOR VACCINATION: ICD-10-CM

## 2024-10-09 DIAGNOSIS — Z83.3 FAMILY HISTORY OF DIABETES MELLITUS: ICD-10-CM

## 2024-10-09 DIAGNOSIS — N94.6 DYSMENORRHEA: ICD-10-CM

## 2024-10-09 PROBLEM — R55 SYNCOPE: Status: RESOLVED | Noted: 2017-04-20 | Resolved: 2024-10-09

## 2024-10-09 PROBLEM — R42 ORTHOSTATIC DIZZINESS: Status: RESOLVED | Noted: 2017-07-31 | Resolved: 2024-10-09

## 2024-10-09 PROBLEM — M54.2 NECK PAIN: Status: RESOLVED | Noted: 2017-08-16 | Resolved: 2024-10-09

## 2024-10-09 PROCEDURE — 99213 OFFICE O/P EST LOW 20 MIN: CPT | Performed by: FAMILY MEDICINE

## 2024-10-09 PROCEDURE — 99395 PREV VISIT EST AGE 18-39: CPT | Performed by: FAMILY MEDICINE

## 2024-10-09 PROCEDURE — 3078F DIAST BP <80 MM HG: CPT | Performed by: FAMILY MEDICINE

## 2024-10-09 PROCEDURE — 90656 IIV3 VACC NO PRSV 0.5 ML IM: CPT | Performed by: FAMILY MEDICINE

## 2024-10-09 PROCEDURE — 3074F SYST BP LT 130 MM HG: CPT | Performed by: FAMILY MEDICINE

## 2024-10-09 PROCEDURE — 90471 IMMUNIZATION ADMIN: CPT | Performed by: FAMILY MEDICINE

## 2024-10-09 PROCEDURE — 3008F BODY MASS INDEX DOCD: CPT | Performed by: FAMILY MEDICINE

## 2024-10-09 NOTE — PROGRESS NOTES
Chief Complaint   Patient presents with    Physical       HPI:  23yr old female presents for her annual physical and f/u on other concerns.  Menses regular, LMP late Sept. Not sexually active. Dysmenorrhea, has been taking naproxen as needed and helps a little. Notes heavy bleeding and clots on day 2.   Celiac disease, stable. However, does note having random diarrhea but unsure of what is causing it. Denies any abdominal at the time.  Multiple nevi and family hx of skin ca, requesting referral to derm.    Review of Systems   Constitutional: Negative for fever, chills and fatigue   HENT: Negative for hearing loss, congestion, sore throat    Eyes: Negative for pain and visual disturbance.   Respiratory: Negative for cough, chest tightness, shortness of breath and wheezing.    Cardiovascular: Negative for chest pain, palpitations and leg swelling.   Gastrointestinal: Negative for nausea, vomiting, abdominal pain, +diarrhea  Genitourinary: Negative for dysuria, hematuria and difficulty urinating.   Musculoskeletal: Negative for myalgias, back pain, joint swelling   Skin: per hpi  Neurological: Negative for dizziness, syncope, weakness, and headaches.   Hematological: Negative for adenopathy. Does not bruise/bleed easily.   Psychiatric/Behavioral: The patient is not nervous/anxious. No depression.    Patient Active Problem List   Diagnosis    Celiac disease (HCC)       Past Medical History:    ASTHMA    has been fine since age 5-6 years    Asthma (HCC)    I have not had an asthma attack in over 10 years     Past Surgical History:   Procedure Laterality Date    Egd N/A 2/27/2017    Procedure: ESOPHAGOGASTRODUODENOSCOPY (EGD);  Surgeon: Emerson Mckeon MD;  Location:  ENDOSCOPY     Family History   Problem Relation Age of Onset    Mental Disorder Father         bipolar    High Cholesterol Father     Other (Other) Mother         migraines    High Cholesterol Maternal Grandmother     High Blood Pressure Maternal  Grandmother     Other (Other) Maternal Grandmother         seizure disorder, migraines, osteoporosis    High Cholesterol Maternal Grandfather     High Blood Pressure Maternal Grandfather     Cancer Paternal Grandmother         HX uterine CA, had hysterectomy and radiation, in remission    Diabetes Sister         type 1     Social History     Socioeconomic History    Marital status: Single   Tobacco Use    Smoking status: Never     Passive exposure: Never    Smokeless tobacco: Never   Vaping Use    Vaping status: Never Used   Substance and Sexual Activity    Alcohol use: Yes     Alcohol/week: 5.0 standard drinks of alcohol     Types: 5 Standard drinks or equivalent per week     Comment: Usually closer to 3 drinks a week, max is 5    Drug use: No    Sexual activity: Not Currently   Other Topics Concern    Caffeine Concern Yes    Exercise Yes    Seat Belt Yes    Special Diet Yes     Comment: Gluten free diet    Stress Concern No    Weight Concern No     Social Drivers of Health      [facility]    Food Insecurities    Received from [facility]    Transportation    Received from [facility]    Housing/Utilities       Current Outpatient Medications   Medication Sig Dispense Refill    naproxen 500 MG Oral Tab Take 1 tablet (500 mg total) by mouth 2 (two) times daily with meals. Take for day 1-2 of your period 180 tablet 0    Cholecalciferol (VITAMIN D3) 1000 units Oral Cap Take by mouth daily.      ondansetron (ZOFRAN) 4 mg tablet Take 1 tablet (4 mg total) by mouth every 8 (eight) hours as needed for Nausea (during menstrual cycle). 20 tablet 0    Multiple Vitamins-Minerals (MULTI VITAMIN/MINERALS) Oral Tab Take by mouth daily.         Allergies[1]      Physical Exam  /60   Pulse 64   Temp 97.5 °F (36.4 °C) (Temporal)   Resp 16   Ht 5' 8\" (1.727 m)   Wt 132 lb 4 oz (60 kg)   LMP 09/20/2024   SpO2 99%   BMI  20.11 kg/m²   Constitutional: Oriented to person, place, and time. No distress.   HEENT:  Normocephalic and atraumatic. Hearing and tympanic membranes normal. Oropharynx is clear and moist.   Eyes: EOM are normal. PERRLA.    Neck: Supple  Cardiovascular: Normal rate, regular rhythm and intact distal pulses.  No murmur, rubs or gallops.   Pulmonary/Chest: Effort normal and breath sounds normal. No respiratory distress. No wheezes, rhonchi or rales  Abdominal: Soft. Bowel sounds are normal. Non tender, no masses, no organomegaly   Musculoskeletal: Normal range of motion. No edema and no tenderness.   Lymphadenopathy: No cervical adenopathy.   Neurological: Normal reflexes. No cranial nerve deficit or sensory deficit. Normal muscle tone. Coordination normal.   Skin: Skin is warm and dry. multiple nevi and freckles noted  Psychiatric: Normal mood and affect.     Health Maintenance:    1. Colon cancer screening: n/a  2. Last mammogram: n/a  3. Last Pap smear: 2023  4. Dexa Scan: n/a  5. Immunizations:   Immunization History   Administered Date(s) Administered    >=3 YRS QUAD MULTIDOSE VIAL (21610) FLU CLINIC 11/03/2016, 10/26/2017    Covid-19 Vaccine Moderna 100 mcg/0.5 ml 01/15/2021, 02/12/2021    Covid-19 Vaccine Moderna 50 Mcg/0.25 Ml 12/14/2021    DTAP 02/19/2001, 04/17/2001, 06/18/2001, 03/11/2002, 02/11/2005    FLU VAC QIV SPLIT 3 YRS AND OLDER (49121) 10/30/2014, 11/17/2015, 11/10/2018    FLULAVAL 6 months & older 0.5 ml Prefilled syringe (78049) 10/22/2020    FLUZONE 6 months and older PFS 0.5 ml (32302) 10/13/2023    HEP A,Ped/Adol,(2 Dose) 04/04/2015, 08/02/2016    HEP B 12/13/2000, 01/29/2001, 06/18/2001    HIB 02/19/2001, 04/17/2001, 06/18/2001, 03/11/2002    HPV (Gardasil) 04/04/2015, 06/03/2015    Hpv Virus Vaccine 9 Molly Im 11/17/2015    IPV 02/19/2001, 04/17/2001, 03/11/2002, 02/11/2005    Influenza 11/12/2002, 12/17/2002, 11/09/2006, 11/26/2007, 12/11/2008, 12/03/2009, 10/26/2010, 10/20/2011, 12/06/2012,  10/23/2013, 10/13/2022    Influenza Vaccine, trivalent (IIV3), PF 0.5mL (76898) 10/09/2024    MMR 12/19/2001, 02/11/2005    Meningococcal-Menactra 06/19/2012, 02/24/2018    Pfizer Covid-19 Vaccine 30mcg/0.3ml 12yrs+ 01/06/2024    Pneumococcal (Prevnar 7) 02/19/2001, 04/17/2001, 06/18/2001    TDAP 06/19/2012, 08/15/2022    Varicella 12/19/2001, 05/12/2010       Osteoporosis Screening: Post menopausal women with a > 9% of fracture in the next 10 years.   Http://www.shef.ac.uk/FRAX/tool.aspx?country=9    Cervical Cancer Screening: The USPSTF recommends screening for cervical cancer in women ages 21 to 65 years with cytology (Pap smear) every 3 years or, for women ages 30 to 65 years who want to lengthen the screening interval, screening with a combination of cytology and human papillomavirus (HPV) testing every 5 years.    Breast Cancer Screening: Yearly starting at the age of 40.  If positive family hx (first degree relative) - Annual mammography starting ten years prior to the age of the youngest family member with breast cancer (but not earlier than age 25 and not later than age 40)    Colon Cancer Screening: Starting at the age of 50.  Yearly FOBT, sigmoidoscopy every 5 years, or colonoscopy every 10 years.       A/P:  1. Routine general medical examination at a health care facility  2. Laboratory exam ordered as part of routine general medical examination  - reviewed anticipatory guidance based on age  - check fasting labs  - CBC With Differential With Platelet; Future  - Comp Metabolic Panel; Future  - Lipid Panel; Future  - Hemoglobin A1C; Future  - TSH W Reflex To Free T4; Future    3. Family history of diabetes mellitus  - Hemoglobin A1C; Future    4. Celiac disease (HCC)  - advised to keep food diary  - start daily probiotics    5. Dysmenorrhea  - cont naproxen prn  - will check pelvic US  - US PELVIS W EV (CPT=76856/92842); Future    6. Multiple nevi  - Derm Referral - In Network    7. Vitamin D  deficiency  - Vitamin D, 25-Hydroxy; Future    8. Need for vaccination  - INFLUENZA VACCINE, TRI, PRESERV FREE, 0.5 ML    She understands and agrees with tx plan  RTC after completing testing, sooner if needed      Meds & Refills for this Visit:  Requested Prescriptions      No prescriptions requested or ordered in this encounter       Imaging & Consults:  INFLUENZA VACCINE, TRI, PRESERV FREE, 0.5 ML  DERM - INTERNAL  US PELVIS W EV (CPT=76856/93236)      No follow-ups on file.         [1]   Allergies  Allergen Reactions    Gluten Flour OTHER (SEE COMMENTS)     Celiac

## 2024-10-10 ENCOUNTER — LAB ENCOUNTER (OUTPATIENT)
Dept: LAB | Age: 24
End: 2024-10-10
Attending: FAMILY MEDICINE
Payer: COMMERCIAL

## 2024-10-10 DIAGNOSIS — E55.9 VITAMIN D DEFICIENCY: ICD-10-CM

## 2024-10-10 DIAGNOSIS — Z00.00 LABORATORY EXAM ORDERED AS PART OF ROUTINE GENERAL MEDICAL EXAMINATION: ICD-10-CM

## 2024-10-10 DIAGNOSIS — Z83.3 FAMILY HISTORY OF DIABETES MELLITUS: ICD-10-CM

## 2024-10-10 LAB
ALBUMIN SERPL-MCNC: 4.2 G/DL (ref 3.2–4.8)
ALBUMIN/GLOB SERPL: 2 {RATIO} (ref 1–2)
ALP LIVER SERPL-CCNC: 51 U/L
ALT SERPL-CCNC: 14 U/L
ANION GAP SERPL CALC-SCNC: 7 MMOL/L (ref 0–18)
AST SERPL-CCNC: 18 U/L (ref ?–34)
BASOPHILS # BLD AUTO: 0.01 X10(3) UL (ref 0–0.2)
BASOPHILS NFR BLD AUTO: 0.2 %
BILIRUB SERPL-MCNC: 0.8 MG/DL (ref 0.3–1.2)
BUN BLD-MCNC: 7 MG/DL (ref 9–23)
CALCIUM BLD-MCNC: 8.9 MG/DL (ref 8.7–10.4)
CHLORIDE SERPL-SCNC: 108 MMOL/L (ref 98–112)
CHOLEST SERPL-MCNC: 171 MG/DL (ref ?–200)
CO2 SERPL-SCNC: 25 MMOL/L (ref 21–32)
CREAT BLD-MCNC: 0.72 MG/DL
EGFRCR SERPLBLD CKD-EPI 2021: 120 ML/MIN/1.73M2 (ref 60–?)
EOSINOPHIL # BLD AUTO: 0.12 X10(3) UL (ref 0–0.7)
EOSINOPHIL NFR BLD AUTO: 2.7 %
ERYTHROCYTE [DISTWIDTH] IN BLOOD BY AUTOMATED COUNT: 12.6 %
EST. AVERAGE GLUCOSE BLD GHB EST-MCNC: 97 MG/DL (ref 68–126)
FASTING PATIENT LIPID ANSWER: YES
FASTING STATUS PATIENT QL REPORTED: YES
GLOBULIN PLAS-MCNC: 2.1 G/DL (ref 2–3.5)
GLUCOSE BLD-MCNC: 89 MG/DL (ref 70–99)
HBA1C MFR BLD: 5 % (ref ?–5.7)
HCT VFR BLD AUTO: 37.9 %
HDLC SERPL-MCNC: 68 MG/DL (ref 40–59)
HGB BLD-MCNC: 12.3 G/DL
IMM GRANULOCYTES # BLD AUTO: 0.01 X10(3) UL (ref 0–1)
IMM GRANULOCYTES NFR BLD: 0.2 %
LDLC SERPL CALC-MCNC: 94 MG/DL (ref ?–100)
LYMPHOCYTES # BLD AUTO: 1.56 X10(3) UL (ref 1–4)
LYMPHOCYTES NFR BLD AUTO: 35.3 %
MCH RBC QN AUTO: 30.9 PG (ref 26–34)
MCHC RBC AUTO-ENTMCNC: 32.5 G/DL (ref 31–37)
MCV RBC AUTO: 95.2 FL
MONOCYTES # BLD AUTO: 0.46 X10(3) UL (ref 0.1–1)
MONOCYTES NFR BLD AUTO: 10.4 %
NEUTROPHILS # BLD AUTO: 2.26 X10 (3) UL (ref 1.5–7.7)
NEUTROPHILS # BLD AUTO: 2.26 X10(3) UL (ref 1.5–7.7)
NEUTROPHILS NFR BLD AUTO: 51.2 %
NONHDLC SERPL-MCNC: 103 MG/DL (ref ?–130)
OSMOLALITY SERPL CALC.SUM OF ELEC: 287 MOSM/KG (ref 275–295)
PLATELET # BLD AUTO: 188 10(3)UL (ref 150–450)
POTASSIUM SERPL-SCNC: 4.4 MMOL/L (ref 3.5–5.1)
PROT SERPL-MCNC: 6.3 G/DL (ref 5.7–8.2)
RBC # BLD AUTO: 3.98 X10(6)UL
SODIUM SERPL-SCNC: 140 MMOL/L (ref 136–145)
TRIGL SERPL-MCNC: 45 MG/DL (ref 30–149)
TSI SER-ACNC: 1.97 MIU/ML (ref 0.55–4.78)
VIT D+METAB SERPL-MCNC: 37 NG/ML (ref 30–100)
VLDLC SERPL CALC-MCNC: 7 MG/DL (ref 0–30)
WBC # BLD AUTO: 4.4 X10(3) UL (ref 4–11)

## 2024-10-10 PROCEDURE — 80061 LIPID PANEL: CPT

## 2024-10-10 PROCEDURE — 82306 VITAMIN D 25 HYDROXY: CPT

## 2024-10-10 PROCEDURE — 80053 COMPREHEN METABOLIC PANEL: CPT

## 2024-10-10 PROCEDURE — 83036 HEMOGLOBIN GLYCOSYLATED A1C: CPT

## 2024-10-10 PROCEDURE — 84443 ASSAY THYROID STIM HORMONE: CPT

## 2024-10-10 PROCEDURE — 85025 COMPLETE CBC W/AUTO DIFF WBC: CPT

## 2024-10-10 PROCEDURE — 36415 COLL VENOUS BLD VENIPUNCTURE: CPT

## 2024-11-07 ENCOUNTER — OFFICE VISIT (OUTPATIENT)
Dept: FAMILY MEDICINE CLINIC | Facility: CLINIC | Age: 24
End: 2024-11-07
Payer: COMMERCIAL

## 2024-11-07 VITALS
RESPIRATION RATE: 16 BRPM | HEIGHT: 68 IN | OXYGEN SATURATION: 99 % | SYSTOLIC BLOOD PRESSURE: 106 MMHG | HEART RATE: 81 BPM | WEIGHT: 134 LBS | BODY MASS INDEX: 20.31 KG/M2 | TEMPERATURE: 97 F | DIASTOLIC BLOOD PRESSURE: 60 MMHG

## 2024-11-07 DIAGNOSIS — H65.01 NON-RECURRENT ACUTE SEROUS OTITIS MEDIA OF RIGHT EAR: Primary | ICD-10-CM

## 2024-11-07 PROCEDURE — 3078F DIAST BP <80 MM HG: CPT | Performed by: FAMILY MEDICINE

## 2024-11-07 PROCEDURE — 3074F SYST BP LT 130 MM HG: CPT | Performed by: FAMILY MEDICINE

## 2024-11-07 PROCEDURE — 3008F BODY MASS INDEX DOCD: CPT | Performed by: FAMILY MEDICINE

## 2024-11-07 PROCEDURE — 99213 OFFICE O/P EST LOW 20 MIN: CPT | Performed by: FAMILY MEDICINE

## 2024-11-07 RX ORDER — AMOXICILLIN 875 MG/1
875 TABLET, COATED ORAL 2 TIMES DAILY
Qty: 20 TABLET | Refills: 0 | Status: SHIPPED | OUTPATIENT
Start: 2024-11-07 | End: 2024-11-17

## 2024-11-07 NOTE — PROGRESS NOTES
HPI:   Haily Wesley is a 23 year old female who presents for upper respiratory symptoms for  4  days. Patient reports bilateral ear pain but worse with her R ear and congestion. States congestion started first and woke up with R ear pain this morning. Has not had an ear infection in years but feels like it. Denies any other symptoms.        Current Outpatient Medications   Medication Sig Dispense Refill    naproxen 500 MG Oral Tab Take 1 tablet (500 mg total) by mouth 2 (two) times daily with meals. Take for day 1-2 of your period 180 tablet 0    Cholecalciferol (VITAMIN D3) 1000 units Oral Cap Take by mouth daily.      ondansetron (ZOFRAN) 4 mg tablet Take 1 tablet (4 mg total) by mouth every 8 (eight) hours as needed for Nausea (during menstrual cycle). 20 tablet 0    Multiple Vitamins-Minerals (MULTI VITAMIN/MINERALS) Oral Tab Take by mouth daily.        Past Medical History:    ASTHMA    has been fine since age 5-6 years    Asthma (HCC)    I have not had an asthma attack in over 10 years      Past Surgical History:   Procedure Laterality Date    Egd N/A 2/27/2017    Procedure: ESOPHAGOGASTRODUODENOSCOPY (EGD);  Surgeon: Emerson Mckeon MD;  Location:  ENDOSCOPY      Family History   Problem Relation Age of Onset    Mental Disorder Father         bipolar    High Cholesterol Father     Other (Other) Mother         migraines    High Cholesterol Maternal Grandmother     High Blood Pressure Maternal Grandmother     Other (Other) Maternal Grandmother         seizure disorder, migraines, osteoporosis    High Cholesterol Maternal Grandfather     High Blood Pressure Maternal Grandfather     Cancer Paternal Grandmother         HX uterine CA, had hysterectomy and radiation, in remission    Diabetes Sister         type 1      Social History     Socioeconomic History    Marital status: Single   Tobacco Use    Smoking status: Never     Passive exposure: Never    Smokeless tobacco: Never   Vaping Use    Vaping  status: Never Used   Substance and Sexual Activity    Alcohol use: Yes     Alcohol/week: 5.0 standard drinks of alcohol     Types: 5 Standard drinks or equivalent per week     Comment: Usually closer to 3 drinks a week, max is 5    Drug use: No    Sexual activity: Not Currently   Other Topics Concern    Caffeine Concern Yes    Exercise Yes    Seat Belt Yes    Special Diet Yes     Comment: Gluten free diet    Stress Concern No    Weight Concern No     Social Drivers of Health      [facility]    Food Insecurities    Received from [facility]    Transportation    Received from [facility]    Housing/Utilities         REVIEW OF SYSTEMS:   GENERAL: feels well otherwise  SKIN: no rashes   HEENT: ear pain, congested  LUNGS: denies shortness of breath with exertion  CARDIOVASCULAR: denies chest pain on exertion  GI: no nausea or abdominal pain  NEURO: denies headaches    EXAM:   /60   Pulse 81   Temp 97.4 °F (36.3 °C) (Temporal)   Resp 16   Ht 5' 8\" (1.727 m)   Wt 134 lb (60.8 kg)   LMP 10/22/2024   SpO2 99%   BMI 20.37 kg/m²   GENERAL: well developed, well nourished,in no apparent distress  SKIN: no rashes,no suspicious lesions  EYES:PERRLA, EOMI   HEENT: atraumatic, normocephalic, R TM erythematous and bulging. L TM wnl and throat is clear  NECK: supple,no adenopathy   LUNGS: clear to auscultation, no w/r/r  CARDIO: RRR without murmur     ASSESSMENT AND PLAN:   Haily Wesley is a 23 year old female who presents with:  1. Non-recurrent acute serous otitis media of right ear  - advised symptomatic tx with tylenol/nsaids  - will start amoxicillin 875mg po bid x 10d, reviewed indications, dosing and SEs  - monitor symptoms  - patient indicates understanding of these issues and agrees to the plan.  - patient is asked to return if sx's persist or worsen.  - amoxicillin 875 MG Oral Tab; Take 1 tablet (875 mg  total) by mouth 2 (two) times daily for 10 days.  Dispense: 20 tablet; Refill: 0

## 2024-11-17 ENCOUNTER — HOSPITAL ENCOUNTER (OUTPATIENT)
Dept: ULTRASOUND IMAGING | Age: 24
Discharge: HOME | End: 2024-11-17
Attending: FAMILY MEDICINE
Payer: COMMERCIAL

## 2024-11-17 ENCOUNTER — HOSPITAL ENCOUNTER (OUTPATIENT)
Dept: ULTRASOUND IMAGING | Age: 24
End: 2024-11-17
Attending: FAMILY MEDICINE
Payer: COMMERCIAL

## 2024-11-17 DIAGNOSIS — N94.6 DYSMENORRHEA: ICD-10-CM

## 2024-11-17 PROCEDURE — 76856 US EXAM PELVIC COMPLETE: CPT | Performed by: FAMILY MEDICINE

## 2025-04-30 ENCOUNTER — PATIENT MESSAGE (OUTPATIENT)
Dept: FAMILY MEDICINE CLINIC | Facility: CLINIC | Age: 25
End: 2025-04-30

## 2025-05-05 NOTE — TELEPHONE ENCOUNTER
Forms printed placed in pending appointment folder .    Future Appointments   Date Time Provider Department Center   10/11/2025 12:00 PM Duarte Lebron,  [facility]

## 2025-05-15 ENCOUNTER — LAB ENCOUNTER (OUTPATIENT)
Dept: LAB | Age: 25
End: 2025-05-15
Attending: FAMILY MEDICINE
Payer: COMMERCIAL

## 2025-05-15 LAB
HBV SURFACE AB SER QL: NONREACTIVE
HBV SURFACE AB SERPL IA-ACNC: <3.1 MIU/ML
HBV SURFACE AG SER-ACNC: 0.2 [IU]/L
HBV SURFACE AG SERPL QL IA: NONREACTIVE
RUBV IGG SER QL: NEGATIVE
RUBV IGG SER-ACNC: 2 IU/ML (ref 10–?)

## 2025-05-15 PROCEDURE — 36415 COLL VENOUS BLD VENIPUNCTURE: CPT | Performed by: FAMILY MEDICINE

## 2025-05-15 PROCEDURE — 86706 HEP B SURFACE ANTIBODY: CPT | Performed by: FAMILY MEDICINE

## 2025-05-15 PROCEDURE — 86787 VARICELLA-ZOSTER ANTIBODY: CPT | Performed by: FAMILY MEDICINE

## 2025-05-15 PROCEDURE — 86735 MUMPS ANTIBODY: CPT | Performed by: FAMILY MEDICINE

## 2025-05-15 PROCEDURE — 86480 TB TEST CELL IMMUN MEASURE: CPT | Performed by: FAMILY MEDICINE

## 2025-05-15 PROCEDURE — 87340 HEPATITIS B SURFACE AG IA: CPT | Performed by: FAMILY MEDICINE

## 2025-05-15 PROCEDURE — 86765 RUBEOLA ANTIBODY: CPT | Performed by: FAMILY MEDICINE

## 2025-05-15 PROCEDURE — 86762 RUBELLA ANTIBODY: CPT | Performed by: FAMILY MEDICINE

## 2025-05-19 ENCOUNTER — PATIENT MESSAGE (OUTPATIENT)
Dept: FAMILY MEDICINE CLINIC | Facility: CLINIC | Age: 25
End: 2025-05-19

## 2025-05-19 LAB
M TB IFN-G CD4+ T-CELLS BLD-ACNC: 0.04 IU/ML
M TB TUBERC IFN-G BLD QL: NEGATIVE
M TB TUBERC IGNF/MITOGEN IGNF CONTROL: >10 IU/ML
MEV IGG SER-ACNC: <5 AU/ML (ref 16.5–?)
MUV IGG SER IA-ACNC: <5 AU/ML (ref 11–?)
QFT TB1 AG MINUS NIL: -0.02 IU/ML
QFT TB2 AG MINUS NIL: -0.02 IU/ML
VZV IGG SER IA-ACNC: 0.08 (ref 1–?)

## 2025-05-27 ENCOUNTER — NURSE ONLY (OUTPATIENT)
Dept: FAMILY MEDICINE CLINIC | Facility: CLINIC | Age: 25
End: 2025-05-27
Payer: COMMERCIAL

## 2025-05-27 PROCEDURE — 90472 IMMUNIZATION ADMIN EACH ADD: CPT | Performed by: FAMILY MEDICINE

## 2025-05-27 PROCEDURE — 90746 HEPB VACCINE 3 DOSE ADULT IM: CPT | Performed by: FAMILY MEDICINE

## 2025-05-27 PROCEDURE — 90707 MMR VACCINE SC: CPT | Performed by: FAMILY MEDICINE

## 2025-05-27 PROCEDURE — 90716 VAR VACCINE LIVE SUBQ: CPT | Performed by: FAMILY MEDICINE

## 2025-05-27 PROCEDURE — 90471 IMMUNIZATION ADMIN: CPT | Performed by: FAMILY MEDICINE

## 2025-06-17 ENCOUNTER — PATIENT MESSAGE (OUTPATIENT)
Dept: FAMILY MEDICINE CLINIC | Facility: CLINIC | Age: 25
End: 2025-06-17

## 2025-06-20 NOTE — TELEPHONE ENCOUNTER
Pt should repeat Hep B series and then follow-up with titers at least 6wks after finishing boosters

## 2025-06-30 ENCOUNTER — TELEPHONE (OUTPATIENT)
Dept: FAMILY MEDICINE CLINIC | Facility: CLINIC | Age: 25
End: 2025-06-30

## 2025-06-30 ENCOUNTER — NURSE ONLY (OUTPATIENT)
Dept: FAMILY MEDICINE CLINIC | Facility: CLINIC | Age: 25
End: 2025-06-30
Payer: COMMERCIAL

## 2025-06-30 DIAGNOSIS — Z01.84 IMMUNITY STATUS TESTING: Primary | ICD-10-CM

## 2025-06-30 PROCEDURE — 90471 IMMUNIZATION ADMIN: CPT | Performed by: FAMILY MEDICINE

## 2025-06-30 PROCEDURE — 90746 HEPB VACCINE 3 DOSE ADULT IM: CPT | Performed by: FAMILY MEDICINE

## 2025-06-30 NOTE — TELEPHONE ENCOUNTER
pt came in for second dose of hep b vaccine and was asking if she could get titers done before she gets her 3rd dose .   Needs to get 3 dose and titers before October . Please advice .

## 2025-06-30 NOTE — TELEPHONE ENCOUNTER
Dr. Lebron: last office visit 11/7/24. Patient vaccinated for MMR and varicella on 5/27/25, patient requesting repeat titer orders for [employer] school.     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 Advised her to reach out to EMT school to see their recommendations since she is actively in the process of being vaccinated for hepatitis b and to call back office if they are still requesting titers

## 2025-07-02 NOTE — TELEPHONE ENCOUNTER
RN to pt call, left detailed VM on identified mailbox per protocol. Asked pt to callback office if she has any questions.

## 2025-07-02 NOTE — TELEPHONE ENCOUNTER
We usually wait about 6wks post vaccination to check titers. Pt may find out with school their protocols as suggested

## 2025-07-06 ENCOUNTER — HOSPITAL ENCOUNTER (OUTPATIENT)
Age: 25
Discharge: HOME | End: 2025-07-06
Payer: COMMERCIAL

## 2025-07-06 VITALS
TEMPERATURE: 97 F | WEIGHT: 135 LBS | BODY MASS INDEX: 20.46 KG/M2 | DIASTOLIC BLOOD PRESSURE: 82 MMHG | HEART RATE: 86 BPM | OXYGEN SATURATION: 98 % | RESPIRATION RATE: 18 BRPM | SYSTOLIC BLOOD PRESSURE: 132 MMHG | HEIGHT: 68 IN

## 2025-07-06 DIAGNOSIS — S61.012A LACERATION OF LEFT THUMB WITHOUT FOREIGN BODY WITHOUT DAMAGE TO NAIL, INITIAL ENCOUNTER: Primary | ICD-10-CM

## 2025-07-06 PROCEDURE — 99212 OFFICE O/P EST SF 10 MIN: CPT

## 2025-07-06 NOTE — DISCHARGE INSTRUCTIONS
Laceration:     You may take ibuprofen 600 mg every 6 hours as needed for pain.  You may take Tylenol 1000 mg every 6 hours as needed for pain.  Keep wound clean and dry. After the first 24 hours, wash the area daily with wet soap water then dab dry. You may apply an antibiotic ointment such as Neosporin or bacitracin to the area and keep covered anytime that it may become contaminated.  Follow with your primary care physician.  Return to the emergency room if you develop drainage from the wound, worsening redness pain or swelling at the site which extends up the extremity, fever over 103 or other new or worsened symptoms.

## 2025-07-10 NOTE — ED PROVIDER NOTES
Patient Seen in: [facility]        History  Chief Complaint   Patient presents with    Finger Injury    Laceration     Stated Complaint: finger injury    Subjective:   HPI    Patient is a 24-year-old female who is here today with left 1st and 3rd digit finger lacerations, cut her finger with a serrated knife.  Cleaned the wounds last night, came in today because she noted that when she bends her finger she has some bleeding.  She is up-to-date on her tetanus.  She denies any other injury.      Objective:     Past Medical History:    ASTHMA    has been fine since age 5-6 years    Asthma (HCC)    I have not had an asthma attack in over 10 years              Past Surgical History:   Procedure Laterality Date    Egd N/A 2/27/2017    Procedure: ESOPHAGOGASTRODUODENOSCOPY (EGD);  Surgeon: Emerson Mckeon MD;  Location:  ENDOSCOPY                Social History     Socioeconomic History    Marital status: Single   Tobacco Use    Smoking status: Never     Passive exposure: Never    Smokeless tobacco: Never   Vaping Use    Vaping status: Never Used   Substance and Sexual Activity    Alcohol use: Yes     Alcohol/week: 3.0 standard drinks of alcohol     Types: 3 Standard drinks or equivalent per week     Comment: 3 max, typically 1-2 if that    Drug use: No    Sexual activity: Not Currently   Other Topics Concern    Caffeine Concern No    Exercise Yes    Seat Belt Yes    Special Diet Yes     Comment: Gluten free (celiac)    Stress Concern No    Weight Concern No     Social Drivers of Health      Received from [employer] [facility]    Food Insecurities    [facility]    Transportation    Received from [facility] and [facility]    Housing/Utilities              Review of Systems    Positive for stated complaint: finger injury  Other systems are as noted in HPI.  Constitutional and vital signs reviewed.      All other systems  reviewed and negative except as noted above.                  Physical Exam    ED Triage Vitals [07/06/25 1140]   /82   Pulse 86   Resp 18   Temp 97.4 °F (36.3 °C)   Temp src Oral   SpO2 98 %   O2 Device None (Room air)       Current Vitals:   No data recorded        Physical Exam  VS: Vital signs reviewed. O2 saturation within normal limits for this patient     General: Patient is awake and alert, oriented to person, place and time. Not in acute distress.      HEENT: Head is normocephalic atraumatic. Pupils reactive bilaterally.  EOMs intact.     Lungs: No respiratory distress noted\    Extremities: No edema.  Pulses 2+ extremities.   Brisk capillary refill noted.      Skin: Normal skin turgor, there is a 1 cm superficial laceration to the left thumb, distal to the DIP joint, there is no nail involvement.  The third digit has a very small, superficial skin avulsion.  Nothing to suture at this time.    CNS: Moves all 4 extremities.  Interacts appropriately.  No tremor.  No gait abnormality              ED Course  Labs Reviewed - No data to display       I have personally  reviewed available prior medical records for any recent pertinent discharge summaries/testing. Patient/family updated on results and plan, a verbalized understanding and agreement with the plan.  I explained to the patient that emergent conditions may arise and to go to the ER for new, worsening or any persistent conditions. I've explained the importance of taking all medicatons as prescribed, follow up, and return precuations,  All questions answered.    Please note that this report has been produced using speech recognition software and may contain errors related to that system including, but not limited to, errors in grammar, punctuation, and spelling, as well as words and phrases that possibly may have been recognized inappropriately.  If there are any questions or concerns, contact the dictating provider for clarification.                   MDM     Patient presents for very superficial laceration.  Wound is clean, no foreign body identified.  Tetanus is up-to-date incision is superficial, no sutures were needed to close the wound at this time.  Patient was instructed on wound care.  There is good wound approximation and no bleeding noted after the procedure.  Oxygen saturation is 99% on room air which is normal for this patient.  Patient has no other physical complaints.  Patient was instructed on need for follow-up with PCP and return to ED precautions          Medical Decision Making      Disposition and Plan     Clinical Impression:  1. Laceration of left thumb without foreign body without damage to nail, initial encounter         Disposition:  Discharge  7/6/2025 12:12 pm    Follow-up:  Duarte Lebron DO  [facility] [address] [address]  [phone]                Medications Prescribed:  Discharge Medication List as of 7/6/2025 12:14 PM                Supplementary Documentation:

## 2025-08-15 ENCOUNTER — HOSPITAL ENCOUNTER (OUTPATIENT)
Age: 25
Discharge: HOME | End: 2025-08-15
Attending: EMERGENCY MEDICINE

## 2025-08-15 VITALS
HEART RATE: 75 BPM | TEMPERATURE: 99 F | DIASTOLIC BLOOD PRESSURE: 76 MMHG | SYSTOLIC BLOOD PRESSURE: 118 MMHG | BODY MASS INDEX: 21 KG/M2 | RESPIRATION RATE: 18 BRPM | OXYGEN SATURATION: 100 % | WEIGHT: 135 LBS

## 2025-08-15 DIAGNOSIS — J02.9 VIRAL PHARYNGITIS: Primary | ICD-10-CM

## 2025-08-15 LAB — S PYO AG THROAT QL IA.RAPID: NEGATIVE

## 2025-08-15 PROCEDURE — 99213 OFFICE O/P EST LOW 20 MIN: CPT

## 2025-08-15 PROCEDURE — 87651 STREP A DNA AMP PROBE: CPT | Performed by: EMERGENCY MEDICINE

## 2025-08-15 RX ORDER — METHYLPREDNISOLONE 4 MG/1
TABLET ORAL
Qty: 1 EACH | Refills: 0 | Status: SHIPPED | OUTPATIENT
Start: 2025-08-15 | End: 2025-08-15

## 2025-08-21 ENCOUNTER — TELEPHONE (OUTPATIENT)
Dept: FAMILY MEDICINE CLINIC | Facility: CLINIC | Age: 25
End: 2025-08-21

## 2025-08-21 DIAGNOSIS — Z01.84 IMMUNITY STATUS TESTING: Primary | ICD-10-CM

## 2025-08-22 ENCOUNTER — LAB ENCOUNTER (OUTPATIENT)
Dept: LAB | Age: 25
End: 2025-08-22
Attending: FAMILY MEDICINE

## 2025-08-22 DIAGNOSIS — Z01.84 IMMUNITY STATUS TESTING: ICD-10-CM

## 2025-08-22 LAB
RUBV IGG SER QL: POSITIVE
RUBV IGG SER-ACNC: 22 IU/ML (ref 10–?)

## 2025-08-22 PROCEDURE — 86765 RUBEOLA ANTIBODY: CPT

## 2025-08-22 PROCEDURE — 36415 COLL VENOUS BLD VENIPUNCTURE: CPT

## 2025-08-22 PROCEDURE — 86762 RUBELLA ANTIBODY: CPT

## 2025-08-22 PROCEDURE — 86787 VARICELLA-ZOSTER ANTIBODY: CPT

## 2025-08-22 PROCEDURE — 86735 MUMPS ANTIBODY: CPT

## 2025-08-25 LAB
MEV IGG SER-ACNC: >300 AU/ML (ref 16.5–?)
MUV IGG SER IA-ACNC: <5 AU/ML (ref 11–?)
VZV IGG SER IA-ACNC: 0.3 (ref 1–?)

## (undated) DEVICE — FORCEP BIOPSY RJ4 LG CAP W/ND

## (undated) DEVICE — Device: Brand: DEFENDO AIR/WATER/SUCTION AND BIOPSY VALVE

## (undated) NOTE — LETTER
12/14/20        [address] [address]      Dear [facility],    [facility] records indicate that you have outstanding lab work and or testing that was ordered for you and has not yet been completed:  Orders Placed This Encounter

## (undated) NOTE — ED AVS SNAPSHOT
[facility] Emergency Department    [place]  [address] [address]    Phone:  [phone]    Fax:  [address]   MRN: [id number]    Department:  [facility] Emergency Department   Date of Visit:  1/1/ have any questions regarding your home medications, including potential side effects.               Medication List      START taking these medications     Cyclobenzaprine HCl 10 MG Tabs   Quantity:  20 tablet   Commonly known as:  cyclobenzaprine   Take 1 Pediatric [phone] Emergency Department   [phone]       To Check ER Wait Times:  TEXT 'ERwait' to 96244      Click [email or web address]      Or call [phone]    If you have any problems with your follow-up, please call our case [facility] before you leave. After you leave, you should follow the attached instructions. I have read and understand the instructions given to me by my caregivers. 24-Hour Pharmacies        Pharmacy Address Phone Number   [address]. [employer] access allows you to view health information for your child from their recent   visit, view other health information and more. To sign up or find more information on getting   Proxy Access to your child’s [employer] go to [email or web address]

## (undated) NOTE — IP AVS SNAPSHOT
[facility] [place] [address] [place], [address] ~ [phone]                Discharge Summary   2/27/2017    Moo Huggins           Admission Information        Provider Department    2/27/2017 Denise Ortiz MD  Endoscop biopsy, please call them for your results.     Additional Comments/Instructions (if applicable):      Discharge References/Attachments     GASTRITIS, TREATING (ENGLISH)    GASTRITIS, UNDERSTANDING (ENGLISH)    DUODENITIS (ENGLISH)      Instructions and Info HgbA1C Glucose BUN Creatinine Calcium Alkaline Phosph AST    -- (01/01/17)  83 (01/01/17)  10 (01/01/17)  0.70 (01/01/17)  9.2 (01/01/17)  95 (01/01/17)  13 (L)      Metabolic Lab Results  (Last result in the past 90 days)    ALT Bilirubin,Total Total Pro MyChart Questions? Call [phone] for help. [employer] is NOT to be used for urgent needs. For medical emergencies, dial 911.

## (undated) NOTE — LETTER
Patient: Haily Wesley   YOB: 2000   Date of Visit: 9/12/2024     Dear Employer,        September 12, 2024    At [facility], we are taking special precautions and doing everything we can to prevent the spread of COVID-19. During this time, we ask for your assistance regarding physician documentation for employees to return to work following a respiratory illness.     The Centers for Disease Control (CDC) recommends the following:    Ensure that sick leave policies are flexible and consistent with public health guidance, and employees are aware of and understand these policies.   Maintain flexible policies that permit employees to stay home to care for a sick family member or to take care of children due to school and  closures.   Employers should not require a COVID-19 test result or a healthcare provider’s note for sick employees to validate their illness, qualify for sick leave or return to work.   Be aware that healthcare provider offices and medical facilities may be extremely busy and not able to provide documentation in a timely manner. Most people with COVID-19 have mild illness, can recover at home without medical care and can follow CDC recommendations to determine when to discontinue home isolation and return to work.      Individuals with COVID-19 symptoms directed to care for themselves at home should:  Isolate for five days. If individuals are asymptomatic or symptoms are resolving (without fever for 24 hours), isolation may be discontinued on day six. Onset of symptoms is considered day zero.   Follow isolation by five days of mask wearing when around others to minimize the risk of infection.     Individuals infected with SARS-CoV-2 who never develop COVID-19 symptoms:    Day of positive test is considered day zero.   Isolate for five days.  Can discontinue isolation on day six.   Follow isolation by five days of wearing a mask when around others to minimize the risk  of infection.    Individuals who are asymptomatic but have been exposed:  For people who are unvaccinated or are more than six months out from their second mRNA dose (or more than two months after the J&J vaccine) and not yet boosted, CDC now recommends quarantine for five days followed by strict, mask use for an additional five days. Alternatively, if a five-day quarantine is not feasible, it is imperative that an exposed person wear a well-fitting mask at all times when around others for 10 days after exposure.   Individuals who have received their booster shot do not need to quarantine following an exposure but should wear a mask for 10 days after the exposure.    Best practice would also include a test on day five after exposure.   If symptoms occur, individuals should immediately quarantine until a negative test confirms symptoms are not attributable to COVID-19.     Please visit the CDC website for further information and details to assist you during this challenging time.     Sincerely,     No att. providers found

## (undated) NOTE — ED AVS SNAPSHOT
[facility] Emergency Department    [place]  [address] [address]    Phone:  [phone]    Fax:  [address]   MRN: [id number]    Department:  [facility] Emergency Department   Date of Visit:  1/1/ IF THERE IS ANY CHANGE OR WORSENING OF YOUR CONDITION, CALL YOUR PRIMARY CARE PHYSICIAN AT ONCE OR RETURN IMMEDIATELY TO THE EMERGENCY DEPARTMENT.     If you have been prescribed any medication(s), please fill your prescription right away and begin taking t